# Patient Record
Sex: MALE | Race: WHITE | Employment: OTHER | URBAN - METROPOLITAN AREA
[De-identification: names, ages, dates, MRNs, and addresses within clinical notes are randomized per-mention and may not be internally consistent; named-entity substitution may affect disease eponyms.]

---

## 2016-05-25 LAB
CHOLEST SERPL-MCNC: 186 MG/DL
HDLC SERPL-MCNC: 39 MG/DL
LDLC SERPL CALC-MCNC: 124 MG/DL
NONHDLC SERPL-MCNC: NORMAL MG/DL
TRIGL SERPL-MCNC: 114 MG/DL

## 2017-03-22 ENCOUNTER — TRANSFERRED RECORDS (OUTPATIENT)
Dept: HEALTH INFORMATION MANAGEMENT | Facility: CLINIC | Age: 82
End: 2017-03-22

## 2018-04-06 ENCOUNTER — TRANSFERRED RECORDS (OUTPATIENT)
Dept: HEALTH INFORMATION MANAGEMENT | Facility: CLINIC | Age: 83
End: 2018-04-06

## 2018-12-19 ENCOUNTER — APPOINTMENT (OUTPATIENT)
Dept: CT IMAGING | Facility: CLINIC | Age: 83
DRG: 178 | End: 2018-12-19
Attending: INTERNAL MEDICINE
Payer: MEDICARE

## 2018-12-19 ENCOUNTER — APPOINTMENT (OUTPATIENT)
Dept: CT IMAGING | Facility: CLINIC | Age: 83
DRG: 178 | End: 2018-12-19
Attending: NURSE PRACTITIONER
Payer: MEDICARE

## 2018-12-19 ENCOUNTER — APPOINTMENT (OUTPATIENT)
Dept: GENERAL RADIOLOGY | Facility: CLINIC | Age: 83
DRG: 178 | End: 2018-12-19
Attending: NURSE PRACTITIONER
Payer: MEDICARE

## 2018-12-19 ENCOUNTER — HOSPITAL ENCOUNTER (INPATIENT)
Facility: CLINIC | Age: 83
LOS: 4 days | Discharge: NURSING, REHAB OR LONG-TERM CARE | DRG: 178 | End: 2018-12-23
Attending: NURSE PRACTITIONER | Admitting: HOSPITALIST
Payer: MEDICARE

## 2018-12-19 ENCOUNTER — TRANSFERRED RECORDS (OUTPATIENT)
Dept: HEALTH INFORMATION MANAGEMENT | Facility: CLINIC | Age: 83
End: 2018-12-19

## 2018-12-19 DIAGNOSIS — R79.89 ELEVATED TROPONIN: ICD-10-CM

## 2018-12-19 DIAGNOSIS — G89.29 CHRONIC LOW BACK PAIN, UNSPECIFIED BACK PAIN LATERALITY, WITH SCIATICA PRESENCE UNSPECIFIED: ICD-10-CM

## 2018-12-19 DIAGNOSIS — M62.81 GENERALIZED MUSCLE WEAKNESS: ICD-10-CM

## 2018-12-19 DIAGNOSIS — M54.5 CHRONIC LOW BACK PAIN, UNSPECIFIED BACK PAIN LATERALITY, WITH SCIATICA PRESENCE UNSPECIFIED: ICD-10-CM

## 2018-12-19 DIAGNOSIS — J69.0 ASPIRATION PNEUMONIA, UNSPECIFIED ASPIRATION PNEUMONIA TYPE, UNSPECIFIED LATERALITY, UNSPECIFIED PART OF LUNG (H): ICD-10-CM

## 2018-12-19 DIAGNOSIS — J18.9 PNEUMONIA: ICD-10-CM

## 2018-12-19 LAB
ALBUMIN SERPL-MCNC: 3.5 G/DL (ref 3.4–5)
ALBUMIN UR-MCNC: 10 MG/DL
ALP SERPL-CCNC: 50 U/L (ref 40–150)
ALT SERPL W P-5'-P-CCNC: 24 U/L (ref 0–70)
ANION GAP SERPL CALCULATED.3IONS-SCNC: 8 MMOL/L (ref 3–14)
APPEARANCE UR: CLEAR
AST SERPL W P-5'-P-CCNC: 21 U/L (ref 0–45)
BASOPHILS # BLD AUTO: 0 10E9/L (ref 0–0.2)
BASOPHILS NFR BLD AUTO: 0.2 %
BILIRUB SERPL-MCNC: 1.2 MG/DL (ref 0.2–1.3)
BILIRUB UR QL STRIP: NEGATIVE
BUN SERPL-MCNC: 16 MG/DL (ref 7–30)
CALCIUM SERPL-MCNC: 8.4 MG/DL (ref 8.5–10.1)
CHLORIDE SERPL-SCNC: 108 MMOL/L (ref 94–109)
CO2 BLDCOV-SCNC: 25 MMOL/L (ref 21–28)
CO2 SERPL-SCNC: 25 MMOL/L (ref 20–32)
COLOR UR AUTO: YELLOW
CREAT SERPL-MCNC: 1.07 MG/DL (ref 0.66–1.25)
DIFFERENTIAL METHOD BLD: ABNORMAL
EOSINOPHIL # BLD AUTO: 0 10E9/L (ref 0–0.7)
EOSINOPHIL NFR BLD AUTO: 0.1 %
ERYTHROCYTE [DISTWIDTH] IN BLOOD BY AUTOMATED COUNT: 13.6 % (ref 10–15)
FLUAV+FLUBV AG SPEC QL: NEGATIVE
FLUAV+FLUBV AG SPEC QL: NEGATIVE
GFR SERPL CREATININE-BSD FRML MDRD: 63 ML/MIN/{1.73_M2}
GLUCOSE BLDC GLUCOMTR-MCNC: 126 MG/DL (ref 70–99)
GLUCOSE SERPL-MCNC: 106 MG/DL (ref 70–99)
GLUCOSE UR STRIP-MCNC: NEGATIVE MG/DL
HCT VFR BLD AUTO: 36.8 % (ref 40–53)
HGB BLD-MCNC: 12.9 G/DL (ref 13.3–17.7)
HGB UR QL STRIP: ABNORMAL
HYALINE CASTS #/AREA URNS LPF: 1 /LPF (ref 0–2)
IMM GRANULOCYTES # BLD: 0 10E9/L (ref 0–0.4)
IMM GRANULOCYTES NFR BLD: 0.1 %
INR PPP: 2.05 (ref 0.86–1.14)
INTERPRETATION ECG - MUSE: NORMAL
KETONES UR STRIP-MCNC: NEGATIVE MG/DL
LACTATE BLD-SCNC: 0.6 MMOL/L (ref 0.7–2.1)
LACTATE BLD-SCNC: 1.1 MMOL/L (ref 0.7–2)
LEUKOCYTE ESTERASE UR QL STRIP: NEGATIVE
LYMPHOCYTES # BLD AUTO: 1.2 10E9/L (ref 0.8–5.3)
LYMPHOCYTES NFR BLD AUTO: 14.5 %
MCH RBC QN AUTO: 33.1 PG (ref 26.5–33)
MCHC RBC AUTO-ENTMCNC: 35.1 G/DL (ref 31.5–36.5)
MCV RBC AUTO: 94 FL (ref 78–100)
MONOCYTES # BLD AUTO: 0.9 10E9/L (ref 0–1.3)
MONOCYTES NFR BLD AUTO: 11 %
MUCOUS THREADS #/AREA URNS LPF: PRESENT /LPF
NEUTROPHILS # BLD AUTO: 6.1 10E9/L (ref 1.6–8.3)
NEUTROPHILS NFR BLD AUTO: 74.1 %
NITRATE UR QL: NEGATIVE
NRBC # BLD AUTO: 0 10*3/UL
NRBC BLD AUTO-RTO: 0 /100
PCO2 BLDV: 37 MM HG (ref 40–50)
PH BLDV: 7.43 PH (ref 7.32–7.43)
PH UR STRIP: 6.5 PH (ref 5–7)
PLATELET # BLD AUTO: 157 10E9/L (ref 150–450)
PO2 BLDV: 58 MM HG (ref 25–47)
POTASSIUM SERPL-SCNC: 4 MMOL/L (ref 3.4–5.3)
PROCALCITONIN SERPL-MCNC: 0.31 NG/ML
PROT SERPL-MCNC: 6.7 G/DL (ref 6.8–8.8)
RBC # BLD AUTO: 3.9 10E12/L (ref 4.4–5.9)
RBC #/AREA URNS AUTO: 22 /HPF (ref 0–2)
SAO2 % BLDV FROM PO2: 91 %
SODIUM SERPL-SCNC: 141 MMOL/L (ref 133–144)
SOURCE: ABNORMAL
SP GR UR STRIP: 1.01 (ref 1–1.03)
SPECIMEN SOURCE: NORMAL
SQUAMOUS #/AREA URNS AUTO: <1 /HPF (ref 0–1)
TROPONIN I SERPL-MCNC: 0.04 UG/L (ref 0–0.04)
UROBILINOGEN UR STRIP-MCNC: NORMAL MG/DL (ref 0–2)
WBC # BLD AUTO: 8.3 10E9/L (ref 4–11)
WBC #/AREA URNS AUTO: <1 /HPF (ref 0–5)

## 2018-12-19 PROCEDURE — 25000128 H RX IP 250 OP 636: Performed by: NURSE PRACTITIONER

## 2018-12-19 PROCEDURE — 00000146 ZZHCL STATISTIC GLUCOSE BY METER IP

## 2018-12-19 PROCEDURE — 85610 PROTHROMBIN TIME: CPT | Performed by: NURSE PRACTITIONER

## 2018-12-19 PROCEDURE — 99285 EMERGENCY DEPT VISIT HI MDM: CPT | Mod: 25

## 2018-12-19 PROCEDURE — 96365 THER/PROPH/DIAG IV INF INIT: CPT

## 2018-12-19 PROCEDURE — 87040 BLOOD CULTURE FOR BACTERIA: CPT | Performed by: NURSE PRACTITIONER

## 2018-12-19 PROCEDURE — 96361 HYDRATE IV INFUSION ADD-ON: CPT

## 2018-12-19 PROCEDURE — 12000000 ZZH R&B MED SURG/OB

## 2018-12-19 PROCEDURE — 83605 ASSAY OF LACTIC ACID: CPT

## 2018-12-19 PROCEDURE — 71046 X-RAY EXAM CHEST 2 VIEWS: CPT

## 2018-12-19 PROCEDURE — 99223 1ST HOSP IP/OBS HIGH 75: CPT | Mod: AI | Performed by: HOSPITALIST

## 2018-12-19 PROCEDURE — 84145 PROCALCITONIN (PCT): CPT | Performed by: NURSE PRACTITIONER

## 2018-12-19 PROCEDURE — 36415 COLL VENOUS BLD VENIPUNCTURE: CPT | Performed by: HOSPITALIST

## 2018-12-19 PROCEDURE — 84484 ASSAY OF TROPONIN QUANT: CPT | Performed by: NURSE PRACTITIONER

## 2018-12-19 PROCEDURE — 70450 CT HEAD/BRAIN W/O DYE: CPT

## 2018-12-19 PROCEDURE — 87804 INFLUENZA ASSAY W/OPTIC: CPT | Performed by: NURSE PRACTITIONER

## 2018-12-19 PROCEDURE — 96367 TX/PROPH/DG ADDL SEQ IV INF: CPT

## 2018-12-19 PROCEDURE — 93005 ELECTROCARDIOGRAM TRACING: CPT

## 2018-12-19 PROCEDURE — 51702 INSERT TEMP BLADDER CATH: CPT

## 2018-12-19 PROCEDURE — 80053 COMPREHEN METABOLIC PANEL: CPT | Performed by: NURSE PRACTITIONER

## 2018-12-19 PROCEDURE — 70450 CT HEAD/BRAIN W/O DYE: CPT | Mod: 77

## 2018-12-19 PROCEDURE — 85025 COMPLETE CBC W/AUTO DIFF WBC: CPT | Performed by: NURSE PRACTITIONER

## 2018-12-19 PROCEDURE — 25000128 H RX IP 250 OP 636: Performed by: HOSPITALIST

## 2018-12-19 PROCEDURE — 81001 URINALYSIS AUTO W/SCOPE: CPT | Performed by: NURSE PRACTITIONER

## 2018-12-19 PROCEDURE — 82803 BLOOD GASES ANY COMBINATION: CPT

## 2018-12-19 PROCEDURE — 25000125 ZZHC RX 250

## 2018-12-19 PROCEDURE — 83605 ASSAY OF LACTIC ACID: CPT | Performed by: HOSPITALIST

## 2018-12-19 RX ORDER — ONDANSETRON 4 MG/1
4 TABLET, ORALLY DISINTEGRATING ORAL EVERY 6 HOURS PRN
Status: DISCONTINUED | OUTPATIENT
Start: 2018-12-19 | End: 2018-12-23 | Stop reason: HOSPADM

## 2018-12-19 RX ORDER — ALBUTEROL SULFATE 0.83 MG/ML
2.5 SOLUTION RESPIRATORY (INHALATION) EVERY 6 HOURS PRN
Status: DISCONTINUED | OUTPATIENT
Start: 2018-12-19 | End: 2018-12-23 | Stop reason: HOSPADM

## 2018-12-19 RX ORDER — ACETAMINOPHEN 325 MG/1
650 TABLET ORAL EVERY 4 HOURS PRN
Status: DISCONTINUED | OUTPATIENT
Start: 2018-12-19 | End: 2018-12-19

## 2018-12-19 RX ORDER — AMOXICILLIN 250 MG
2 CAPSULE ORAL 2 TIMES DAILY
Status: DISCONTINUED | OUTPATIENT
Start: 2018-12-19 | End: 2018-12-23 | Stop reason: HOSPADM

## 2018-12-19 RX ORDER — PIPERACILLIN SODIUM, TAZOBACTAM SODIUM 4; .5 G/20ML; G/20ML
4.5 INJECTION, POWDER, LYOPHILIZED, FOR SOLUTION INTRAVENOUS ONCE
Status: COMPLETED | OUTPATIENT
Start: 2018-12-19 | End: 2018-12-19

## 2018-12-19 RX ORDER — NALOXONE HYDROCHLORIDE 0.4 MG/ML
.1-.4 INJECTION, SOLUTION INTRAMUSCULAR; INTRAVENOUS; SUBCUTANEOUS
Status: DISCONTINUED | OUTPATIENT
Start: 2018-12-19 | End: 2018-12-23 | Stop reason: HOSPADM

## 2018-12-19 RX ORDER — POLYETHYLENE GLYCOL 3350 17 G/17G
17 POWDER, FOR SOLUTION ORAL DAILY PRN
Status: DISCONTINUED | OUTPATIENT
Start: 2018-12-19 | End: 2018-12-23 | Stop reason: HOSPADM

## 2018-12-19 RX ORDER — ACETAMINOPHEN 650 MG/1
650 SUPPOSITORY RECTAL EVERY 4 HOURS PRN
Status: DISCONTINUED | OUTPATIENT
Start: 2018-12-19 | End: 2018-12-23 | Stop reason: HOSPADM

## 2018-12-19 RX ORDER — ACETAMINOPHEN 325 MG/1
650 TABLET ORAL EVERY 4 HOURS PRN
Status: DISCONTINUED | OUTPATIENT
Start: 2018-12-19 | End: 2018-12-23 | Stop reason: HOSPADM

## 2018-12-19 RX ORDER — PIPERACILLIN SODIUM, TAZOBACTAM SODIUM 3; .375 G/15ML; G/15ML
3.38 INJECTION, POWDER, LYOPHILIZED, FOR SOLUTION INTRAVENOUS EVERY 6 HOURS
Status: COMPLETED | OUTPATIENT
Start: 2018-12-19 | End: 2018-12-22

## 2018-12-19 RX ORDER — CARBIDOPA/LEVODOPA 25MG-250MG
1 TABLET ORAL
Status: DISCONTINUED | OUTPATIENT
Start: 2018-12-19 | End: 2018-12-23 | Stop reason: HOSPADM

## 2018-12-19 RX ORDER — AZITHROMYCIN 250 MG/1
250 TABLET, FILM COATED ORAL DAILY
Status: COMPLETED | OUTPATIENT
Start: 2018-12-20 | End: 2018-12-23

## 2018-12-19 RX ORDER — AMOXICILLIN 250 MG
1 CAPSULE ORAL 2 TIMES DAILY
Status: DISCONTINUED | OUTPATIENT
Start: 2018-12-19 | End: 2018-12-23 | Stop reason: HOSPADM

## 2018-12-19 RX ORDER — SODIUM CHLORIDE 9 MG/ML
1000 INJECTION, SOLUTION INTRAVENOUS CONTINUOUS
Status: DISCONTINUED | OUTPATIENT
Start: 2018-12-19 | End: 2018-12-21

## 2018-12-19 RX ORDER — WARFARIN SODIUM 5 MG/1
5 TABLET ORAL
COMMUNITY

## 2018-12-19 RX ORDER — ONDANSETRON 2 MG/ML
4 INJECTION INTRAMUSCULAR; INTRAVENOUS EVERY 6 HOURS PRN
Status: DISCONTINUED | OUTPATIENT
Start: 2018-12-19 | End: 2018-12-23 | Stop reason: HOSPADM

## 2018-12-19 RX ORDER — ACETAMINOPHEN 500 MG
500 TABLET ORAL 4 TIMES DAILY
Status: DISCONTINUED | OUTPATIENT
Start: 2018-12-19 | End: 2018-12-19

## 2018-12-19 RX ORDER — TRAMADOL HYDROCHLORIDE 50 MG/1
50 TABLET ORAL EVERY 6 HOURS PRN
Status: DISCONTINUED | OUTPATIENT
Start: 2018-12-19 | End: 2018-12-19

## 2018-12-19 RX ADMIN — LIDOCAINE HYDROCHLORIDE 10 ML: 20 JELLY TOPICAL at 13:50

## 2018-12-19 RX ADMIN — PIPERACILLIN SODIUM,TAZOBACTAM SODIUM 4.5 G: 4; .5 INJECTION, POWDER, FOR SOLUTION INTRAVENOUS at 13:50

## 2018-12-19 RX ADMIN — SODIUM CHLORIDE 1000 ML: 9 INJECTION, SOLUTION INTRAVENOUS at 20:15

## 2018-12-19 RX ADMIN — PIPERACILLIN SODIUM,TAZOBACTAM SODIUM 3.38 G: 3; .375 INJECTION, POWDER, FOR SOLUTION INTRAVENOUS at 20:16

## 2018-12-19 RX ADMIN — AZITHROMYCIN MONOHYDRATE 500 MG: 500 INJECTION, POWDER, LYOPHILIZED, FOR SOLUTION INTRAVENOUS at 14:38

## 2018-12-19 RX ADMIN — SODIUM CHLORIDE 1000 ML: 9 INJECTION, SOLUTION INTRAVENOUS at 12:23

## 2018-12-19 ASSESSMENT — ACTIVITIES OF DAILY LIVING (ADL)
RETIRED_COMMUNICATION: 0-->UNDERSTANDS/COMMUNICATES WITHOUT DIFFICULTY
RETIRED_EATING: 0-->INDEPENDENT
DRESS: 0-->INDEPENDENT
COGNITION: 0 - NO COGNITION ISSUES REPORTED
BATHING: 0-->INDEPENDENT
TRANSFERRING: 0-->INDEPENDENT
TOILETING: 0-->INDEPENDENT
SWALLOWING: 0-->SWALLOWS FOODS/LIQUIDS WITHOUT DIFFICULTY
AMBULATION: 1-->ASSISTIVE EQUIPMENT
ADLS_ACUITY_SCORE: 13
FALL_HISTORY_WITHIN_LAST_SIX_MONTHS: NO

## 2018-12-19 ASSESSMENT — ENCOUNTER SYMPTOMS
ABDOMINAL PAIN: 0
DIAPHORESIS: 1
SHORTNESS OF BREATH: 0
FEVER: 1
COUGH: 1
WEAKNESS: 1

## 2018-12-19 ASSESSMENT — MIFFLIN-ST. JEOR
SCORE: 1613.42
SCORE: 1589.84

## 2018-12-19 NOTE — H&P
[facility]    History and Physical - Hospitalist Service       Date of Admission:  12/19/2018    Assessment & Plan   Russ Loera is a 84 year old male admitted on 12/19/2018 for cough and fever who was found to have retrocardiac opacity indicating a likely pneumonia.    Community acquired pneumonia   - Left retrocardiac opacity, fever, cough; WBC normal range  - Influenza A/B antigen negative  - blood cultures pending  - procalc 0.31; will recheck tomorrow  - Started on azithro and zosyn in ed, will continue for now  - Incentive spirometry  - prn albuterol nebs    History of PE - on warfarin for anticoagulation  - Pharmacy to dose warfarin  - Saturating normally on RA, monitor clinically  - VBG unremarkable; troponin 0.042, will recheck troponin - no anginal symptoms    Recent falls  - Physical therapy consulted   - CT head completed - chronic small vessel ischemic disease changes, no acute intracranial pathology    History of BPH  - Cummins placed in ED, okay to continue tonight - remove when possible    History of Parkinsons Disease  - Continue PTA carbidopa-levodopa 25-250mg tid     Diet: regular  DVT Prophylaxis: Warfarin  Cummins Catheter: in place, indication:  BPH  Code Status: Full code    Disposition Plan   Expected discharge: In 2-3 days pending improved of weakness and transition to oral antibiotics    Entered: Brian Conde MD 12/19/2018, 4:23 PM     The patient's care was discussed with the Patient.    Brian Conde MD  [facility]    ______________________________________________________________________    Chief Complaint   Cough and fever    History is obtained from the patient    History of Present Illness   Russ Loera is a 84 year old male admitted on 12/19/2018 for cough and fever who was found to have retrocardiac opacity likely reflective of pneumonia. He has a history of PE anticoagulated on warfarin, Parkinson's Disease, GERD, and hypertension.  He  was seen yesterday in clinic with his PCP where he was diagnosed with a likely viral upper respiratory infection and given a prescription for amoxicillin-which was to be started if symptoms worsen.  This morning he was apparently found to bed in bed rail by independent living staff was unable to get himself up.  Temperature was reported to be 101.9.  He was diaphoretic.  EMS was contacted and he has brought to the ED for further workup.  Currently he is in the best historian but says that he started having a sore throat yesterday as well as persistent cough and some weakness in the last few days.  He does endorse fever as well.  Otherwise he denies headache, dizziness, shortness of breath, chest pain or pressure, abdominal pain, nausea, vomiting, or diarrhea.  He has been helping care for his wife who is quite ill it seems that does have a feeding tube.    Review of Systems    The 10 point Review of Systems is negative other than noted in the HPI or here.     Past Medical History    I have reviewed this patient's medical history and updated it with pertinent information if needed.   Past Medical History:   Diagnosis Date     Degenerative joint disease      Gastro-oesophageal reflux disease      GERD (gastroesophageal reflux disease)      History of BPH      Hypertension      Osteoarthritis      Parkinson disease (H)      Parkinsons disease (H)      Plantar fasciitis of right foot      PONV (postoperative nausea and vomiting)      Prostate nodule with urinary obstruction 7-9-12     Pulmonary embolism (H)      Spinal stenosis      Tendonitis, Achilles, right      Unspecified cerebral artery occlusion with cerebral infarction        Past Surgical History   I have reviewed this patient's surgical history and updated it with pertinent information if needed.  Past Surgical History:   Procedure Laterality Date     C REMOVAL OF KIDNEY STONE       C TOTAL KNEE ARTHROPLASTY  2011     HERNIORRHAPHY INGUINAL       INCISION  LIMBAL RELAXING, KERATOTOMY ARCUATE  1/20/2014    Procedure: INCISION LIMBAL RELAXING, KERATOTOMY ARCUATE;;  Surgeon: Rosalio Montoya MD;  Location: [facility]     INCISION LIMBAL RELAXING, KERATOTOMY ARCUATE  2/10/2014    Procedure: INCISION LIMBAL RELAXING, KERATOTOMY ARCUATE;;  Surgeon: Rosalio Montoya MD;  Location: [facility]     LAMINECTOMY LUMBAR MINIMALLY INVASIVE ONE LEVEL       LAMINECTOMY, FUSION LUMBAR TWO LEVELS, COMBINED      L3-L5     PHACOEMULSIFICATION CLEAR CORNEA WITH STANDARD INTRAOCULAR LENS IMPLANT  1/20/2014    Procedure: PHACOEMULSIFICATION CLEAR CORNEA WITH STANDARD INTRAOCULAR LENS IMPLANT;  RIGHT PHACOEMULSIFICATION CLEAR CORNEA WITH STANDARD INTRAOCULAR LENS IMPLANT, LIMBRAL RELAXING INCISION  ;  Surgeon: Rosalio Montoya MD;  Location: [facility]     PHACOEMULSIFICATION CLEAR CORNEA WITH STANDARD INTRAOCULAR LENS IMPLANT  2/10/2014    Procedure: PHACOEMULSIFICATION CLEAR CORNEA WITH STANDARD INTRAOCULAR LENS IMPLANT;  LEFT PHACOEMULSIFICATION CLEAR CORNEA WITH STANDARD INTRAOCULAR LENS IMPLANT WITH LIMBAL RELAXING INCISION;  Surgeon: Rosalio Montoya MD;  Location: [facility]     REVERSE ARTHROPLASTY SHOULDER  12/4/2012    Procedure: REVERSE ARTHROPLASTY SHOULDER;  LEFT REVERSE TOTAL SHOULDER ARTHROPLASTY;  Surgeon: Tre Patel MD;  Location:  [facility]     TONSILLECTOMY         Social History   I have reviewed this patient's social history and updated it with pertinent information if needed.  Social History     Tobacco Use     Smoking status: Never Smoker     Smokeless tobacco: Never Used   Substance Use Topics     Alcohol use: No     Drug use: No       Family History   I have reviewed this patient's family history and updated it with pertinent information if needed.   History reviewed. No pertinent family history.     Prior to Admission Medications   Prior to Admission Medications   Prescriptions Last Dose Informant Patient Reported? Taking?   acetaminophen (TYLENOL) 500 MG tablet 12/19/2018 at  am Self Yes Yes   Sig: Take 500 mg by mouth 4 times daily    carbidopa-levodopa (SINEMET)  MG per tablet 12/19/2018 at 0400 Self Yes Yes   Sig: Take 1 tablet by mouth 3 times daily 0400; 1100; 1700   traMADol (ULTRAM) 50 MG tablet  at prn Self Yes Yes   Sig: Take 50 mg by mouth every 6 hours as needed for moderate pain   warfarin (COUMADIN) 5 MG tablet 12/19/2018 at am Self Yes Yes   Sig: Take 5 mg by mouth daily      Facility-Administered Medications: None     Allergies   Allergies   Allergen Reactions     Oxycodone Anaphylaxis     Fentanyl Nausea     Propofol Nausea and Vomiting       Physical Exam   Vital Signs: Temp: 99.2  F (37.3  C) Temp src: Oral BP: 165/84 Pulse: 76 Heart Rate: 80 Resp: 18 SpO2: 93 % O2 Device: None (Room air)    Weight: 197 lbs 0 oz    GEN: nad, resting, but aroused by verbal cue   HEENT: normocephalic, atraumatic, EOMI, mmm  CV: RRR, s1s2, no murmur  Resp: a few bibasilar crackles noted  Abdo: benign, s, nt, nd, +bs  Ext: no edema, well perfused, nontender  Neuro: oriented x3, no focal deficits, congruent with PD history    Data   Data reviewed today: I reviewed all medications, new labs and imaging results over the last 24 hours. I personally reviewed no images or EKG's today.    Recent Labs   Lab 12/19/18  1125   WBC 8.3   HGB 12.9*   MCV 94      INR 2.05*      POTASSIUM 4.0   CHLORIDE 108   CO2 25   BUN 16   CR 1.07   ANIONGAP 8   RENU 8.4*   *   ALBUMIN 3.5   PROTTOTAL 6.7*   BILITOTAL 1.2   ALKPHOS 50   ALT 24   AST 21   TROPI 0.042

## 2018-12-19 NOTE — ED PROVIDER NOTES
History     Chief Complaint:  Generalized Weakness    The history is provided by the patient and the EMS personnel.      Russ Loera is a 84 year old male with a history of PE anticoagulated on warfarin, Parkinson's Disease, GERD and hypertension who presents to the emergency department via EMS for evaluation of generalized weakness. Of note, the patient lives in an independent living facility with his wife, who he helps care for. Yesterday, the patient was seen by his primary physician, Dr. Yee, at the [facility], where he was diagnosed with a likely viral URI and given a prescription for amoxicillin with instructions to start them if he worsens. This morning, the patient was reportedly found by independent living staff wedged in between his bed and the bed rail and was too weak to get himself up with a temperature of 101.9 and covered in sweat prompting them to contact EMS to have the patient transferred to the ED for further evaluation. Here, the patient denies any co-occurrence of chest pain, shortness of breath or abdominal pain, but does endorse feeling weak along with a persistent cough. He denies taking any of the prescribed antibiotics. Of note, the patient's factor 10 chromogenic yesterday resulted at 30.  The patient had cautery to the area behind his right ear by dermatology due to bleeding and had a biopsy completed.     Allergies:  Oxycodone  Fentanyl  Propofol   Sulfa Drugs     Medications:    Acetaminophen  Carbidopa-Levodopa  Lisinopril  Tamsulosin  Tramadol  Trazodone  Warfarin  Miralax  Senokot   Gabapentin    Past Medical History:    DJD  GERD  BPH  Hypertension  Osteoarthritis   Parkinson's Disease  Prostate Nodule  PE  Spinal Stenosis   Achilles Tendonitis   Cerebral Artery Occlusion with Cerebral Infarction    Past Surgical History:    Kidney Stone Removal   Total knee Arthroplasty  Arcuate Keratotomy   Lumbar Laminectomy x2  Phacoemulsification  "x2  Tonsillectomy  Left Reverse Total Arthroplasty    Family History:    No past pertinent family history.    Social History:  Marital Status:   [2]  Tobacco Use: No  Alcohol Use: No    Review of Systems   Constitutional: Positive for diaphoresis and fever.   Respiratory: Positive for cough. Negative for shortness of breath.    Cardiovascular: Negative for chest pain.   Gastrointestinal: Negative for abdominal pain.   Neurological: Positive for weakness.   All other systems reviewed and are negative.    Physical Exam     Patient Vitals for the past 24 hrs:   BP Temp Temp src Pulse Heart Rate Resp SpO2 Height Weight   12/19/18 1500 165/84 -- -- 76 80 18 -- -- --   12/19/18 1430 (!) 170/126 -- -- 71 77 -- -- -- --   12/19/18 1420 162/88 -- -- -- 66 -- -- -- --   12/19/18 1410 -- -- -- -- 68 -- -- -- --   12/19/18 1400 (!) 155/123 -- -- 65 65 13 -- -- --   12/19/18 1350 144/89 -- -- -- 63 -- -- -- --   12/19/18 1130 (!) 164/96 99.2  F (37.3  C) Oral 71 71 15 93 % 1.778 m (5' 10\") 89.4 kg (197 lb)     Physical Exam  Nursing notes reviewed. Vitals reviewed.  General: Alert. Well kept.  Eyes:  Conjunctiva non-injected, non-icteric.  Ears:TM s normal. Healed surgical site behind right ear without erythema or drainage.  Neck/Throat: Moist mucous membranes, oropharynx clear without erythema or exudate.  Normal voice.  Cardiac: Regular rhythm. Normal heart sounds with no murmur/rubs/click. No peripheral edema.  Pulmonary: Coarse right lower lobe breath sounds.  Abdomen: Soft. Non-distended. Non-tender to palpation. No masses. No guarding or rebound.  Musculoskeletal: Normal gross range of motion of all 4 extremities.  4/5 strength bilateral upper and lower extremities.  Neurological: Alert and oriented x4. Cranial nerves II-XII intact. Finger-nose-finger and rapid alternating thumb-finger coordinated and equal bilateral. Heel shin smooth and equal bilateral. GCS 15. No agitation and not disoriented. Displays no " weakness, no atrophy, no tremor, facial symmetry, normal stance, normal speech. No pronator drift.   Skin: Warm and dry without rashes or petechiae. Normal appearance of visualized exposed skin.  Psych: Affect normal. Good eye contact.    Emergency Department Course   ECG:  Indication: Weakness  Time: 1206  Vent. Rate 75 bpm. AK interval 176. QRS duration 78. QT/QTc 402/448. P-R-T axis 68 -19 -36. Sinus rhythm with premature atrial complexes. Otherwise normal ECG. No significant change compared to EKG dated 7/13/16. Read time: 1210 ([place])    Imaging:  XR Chest 2 views:   Since CT exam on January 29, 2010, heart size is normal. Patchy left retrocardiac opacity is noted. This could relate to atelectasis, though pneumonia is a radiographically possibility. Consider surveillance until resolution. No pleural effusion or pneumothorax. 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   As per radiology.     CT Head without contrast:   Diffuse cerebral volume loss and cerebral white matter changes consistent with chronic small vessel ischemic disease. No evidence for acute intracranial pathology.   As per radiology.    Laboratory:  CBC: WBC: 8.3, HGB: 12.9 (L), PLT: 157  CMP: Glucose 106 (H), Calcium: 8.4 (L), Protein Total: 6.7 (L), o/w WNL (Creatinine: 1.07)  ISTAT VBG: PCO2: 37 (L), PO2: 58 (H), Lactic Acid: 0.6 (L), o/w WNL  Troponin I: 0.042  INR: 2.05 (H)  Procalcitonin: 0.31  Blood Culture x2: Pending  Influenza A/B Antigen: Negative  UA with micro: Blood: Small, Protein Albumin: 10, RBC: 22 (H), Mucous: Present, o/w negative    Interventions:  1223 NS 1L IV  1350 Zosyn 4.5 g, IV  1438 Azithromycin 500 mg, IV    Emergency Department Course:  1142 Nursing notes and vitals reviewed. I performed an exam of the patient as documented above.     IV inserted. Medicine administered as documented above. Blood drawn. This was sent to the lab for further testing, results above.    The patient was sent for a chest xray  and head CT while in the emergency department, findings above.     1210 I rechecked the patient and discussed the results of his workup thus far. I spoke with the wife, through her hospice nurse, who told me that the patient has fallen 3 times in the last 4 days.     1350 I shared service with Dr. Fenton    1354 I consulted with Dr. Conde of the hospitalist services. They are in agreement to accept the patient for admission.    Findings and plan explained to the patient who consents to admission. Discussed the patient with Dr. Conde, who will admit the patient to an inpatient med bed for further monitoring, evaluation, and treatment.    1357 I updated the patient's wife's hospice nurse and updated her on the patient's admission.       Impression & Plan      Medical Decision Making:  Russ Loera is a 84 year old male with a history of parkinson's and PE who presents for evaluation of generalized weakness and cough. The patient had onset of symptoms of sore throat, cough and fever 4 days prior and family notes that he has had 3 falls since onset of symptoms. The patient was able to drive to primary care office yesterday and was diagnosed with a viral illness, but was unable to get out of bed this morning and EMS was called. On arrival of EMS, he did have a temp of 101 and a productive cough. Xray today shows retrocardiac opacity consistent with a pneumonia. His influenza is negative. Procalcitonin and white blood cell count are within normal limits, but with fever of 101 and new retrocardiac opacity he will be treated for healthcare acquire pneumonia. He and his wife live in an assisted living. He was started on Zosyn and azithromycin. There are no other signs of cause of his illness. Urine is without infection. He did have to have a Cummins catheter placed secondary to weakness getting up and history of BPH. Head CT was obtained secondary to recent falls and patient is currently on warfarin. Head CT shows no  intracranial abnormality. He has no focal neurologic deficits and NIH stroke scale is 0. He has no current signs of sepsis. He is hemodynamically stable and lactate is normal. I spoke with Dr. Conde who has admitted him for further management and care. I did speak with his wife regarding his admission.     Diagnosis:    ICD-10-CM    1. Pneumonia J18.9 Procalcitonin     INR     INR     Blood culture     Blood culture     CANCELED: INR   2. Generalized muscle weakness M62.81      Disposition:  Admitted to Dr. Conde    Scribe Disclosure:  I, Drew Jimenez, am serving as a scribe on 12/19/2018 at 11:38 AM to personally document services performed by Beata Krueger CNP based on my observations and the provider's statements to me.       Drew Jimenez  12/19/2018    EMERGENCY DEPARTMENT       Beata Krueger CNP  12/19/18 [id number]       Beata Krueger CNP  12/19/18 [id number]

## 2018-12-19 NOTE — ED NOTES
"[facility]  ED Nurse Handoff Report    ED Chief complaint: Generalized Weakness (Arrives via EMS from home. Lives at the [facility] in [place] but is independant. Takes care of wife who had a stroke. Satff at facility found him today too weak to get up. Wedged between bed and bedrail. St=afff there checked temp and found it to be 101.9. Denies pain, no SOB but sounds wheezy )      ED Diagnosis:   Final diagnoses:   Pneumonia   Generalized muscle weakness       Code Status: Full Code    Allergies:   Allergies   Allergen Reactions     Oxycodone Anaphylaxis     Fentanyl Nausea     Propofol Nausea and Vomiting       Activity level - Baseline/Home:  Independent    Activity Level - Current:   Stand with Assist of 2     Needed?: No    Isolation: No  Infection: Not Applicable  Bariatric?: No    Vital Signs:   Vitals:    12/19/18 1130   BP: (!) 164/96   Pulse: 71   Resp: 15   Temp: 99.2  F (37.3  C)   TempSrc: Oral   SpO2: 93%   Weight: 89.4 kg (197 lb)   Height: 1.778 m (5' 10\")       Cardiac Rhythm: ,        Pain level: 0-10 Pain Scale: 0    Is this patient confused?: No   Does this patient have a guardian?  No         If yes, is there guardianship documents in the Epic \"Code/ACP\" activity?  N/A         Guardian Notified?  N/A  [place] - Suicide Severity Rating Scale Completed?  Yes  If yes, what color did the patient score?  White    Patient Report: Initial Complaint: Russ Loera is a 84 year old male with a history of PE, Parkinson's Disease, GERD and hypertension who presents to the emergency department via EMS for evaluation of generalized weakness. Of note, the patient lives in an independent living facility with his wife, who he helps care for. Yesterday, the patient was seen by his primary physician, Dr. Yee, at the [facility], where he was diagnosed with a likely viral URI and given a prescription for amoxicillin with instructions to start them if he worsens. This " morning, the patient was reportedly found by independent living staff wedged in between his bed and the bed rail and was too weak to get himself up with a temperature of 101.9 and covered in sweat prompting them to contact EMS to have the patient transferred to the ED for further evaluation. Here, the patient denies any co-occurrence of chest pain, shortness of breath or abdominal pain, but does endorse feeling weak along with a persistent cough. He denies taking any of the prescribed antibiotics.    Focused Assessment: generalized weakness, too weak to stand at this point independently, lungs wheezy, has started coughing up secretions since arrival to ED. Prior to arrival denies this was an issue  Tests Performed: labs, EKG, head CT, chest xray  Abnormal Results:   Results for orders placed or performed during the hospital encounter of 12/19/18 (from the past 24 hour(s))   CBC with platelets differential   Result Value Ref Range    WBC 8.3 4.0 - 11.0 10e9/L    RBC Count 3.90 (L) 4.4 - 5.9 10e12/L    Hemoglobin 12.9 (L) 13.3 - 17.7 g/dL    Hematocrit 36.8 (L) 40.0 - 53.0 %    MCV 94 78 - 100 fl    MCH 33.1 (H) 26.5 - 33.0 pg    MCHC 35.1 31.5 - 36.5 g/dL    RDW 13.6 10.0 - 15.0 %    Platelet Count 157 150 - 450 10e9/L    Diff Method Automated Method     % Neutrophils 74.1 %    % Lymphocytes 14.5 %    % Monocytes 11.0 %    % Eosinophils 0.1 %    % Basophils 0.2 %    % Immature Granulocytes 0.1 %    Nucleated RBCs 0 0 /100    Absolute Neutrophil 6.1 1.6 - 8.3 10e9/L    Absolute Lymphocytes 1.2 0.8 - 5.3 10e9/L    Absolute Monocytes 0.9 0.0 - 1.3 10e9/L    Absolute Eosinophils 0.0 0.0 - 0.7 10e9/L    Absolute Basophils 0.0 0.0 - 0.2 10e9/L    Abs Immature Granulocytes 0.0 0 - 0.4 10e9/L    Absolute Nucleated RBC 0.0    Comprehensive metabolic panel   Result Value Ref Range    Sodium 141 133 - 144 mmol/L    Potassium 4.0 3.4 - 5.3 mmol/L    Chloride 108 94 - 109 mmol/L    Carbon Dioxide 25 20 - 32 mmol/L    Anion Gap 8  3 - 14 mmol/L    Glucose 106 (H) 70 - 99 mg/dL    Urea Nitrogen 16 7 - 30 mg/dL    Creatinine 1.07 0.66 - 1.25 mg/dL    GFR Estimate 63 >60 mL/min/[1.73_m2]    GFR Estimate If Black 73 >60 mL/min/[1.73_m2]    Calcium 8.4 (L) 8.5 - 10.1 mg/dL    Bilirubin Total 1.2 0.2 - 1.3 mg/dL    Albumin 3.5 3.4 - 5.0 g/dL    Protein Total 6.7 (L) 6.8 - 8.8 g/dL    Alkaline Phosphatase 50 40 - 150 U/L    ALT 24 0 - 70 U/L    AST 21 0 - 45 U/L   Procalcitonin   Result Value Ref Range    Procalcitonin 0.31 ng/ml   Troponin I   Result Value Ref Range    Troponin I ES 0.042 0.000 - 0.045 ug/L   INR   Result Value Ref Range    INR 2.05 (H) 0.86 - 1.14   ISTAT gases lactate jeffrey POCT   Result Value Ref Range    Ph Venous 7.43 7.32 - 7.43 pH    PCO2 Venous 37 (L) 40 - 50 mm Hg    PO2 Venous 58 (H) 25 - 47 mm Hg    Bicarbonate Venous 25 21 - 28 mmol/L    O2 Sat Venous 91 %    Lactic Acid 0.6 (L) 0.7 - 2.1 mmol/L   EKG 12 lead   Result Value Ref Range    Interpretation ECG Click View Image link to view waveform and result    Influenza A/B antigen   Result Value Ref Range    Influenza A/B Agn Specimen Nares     Influenza A Negative NEG^Negative    Influenza B Negative NEG^Negative   UA with Microscopic reflex to Culture   Result Value Ref Range    Color Urine Yellow     Appearance Urine Clear     Glucose Urine Negative NEG^Negative mg/dL    Bilirubin Urine Negative NEG^Negative    Ketones Urine Negative NEG^Negative mg/dL    Specific Gravity Urine 1.014 1.003 - 1.035    Blood Urine Small (A) NEG^Negative    pH Urine 6.5 5.0 - 7.0 pH    Protein Albumin Urine 10 (A) NEG^Negative mg/dL    Urobilinogen mg/dL Normal 0.0 - 2.0 mg/dL    Nitrite Urine Negative NEG^Negative    Leukocyte Esterase Urine Negative NEG^Negative    Source Midstream Urine     WBC Urine <1 0 - 5 /HPF    RBC Urine 22 (H) 0 - 2 /HPF    Squamous Epithelial /HPF Urine <1 0 - 1 /HPF    Mucous Urine Present (A) NEG^Negative /LPF    Hyaline Casts 1 0 - 2 /LPF   Chest XR,  PA & LAT     Narrative    CHEST TWO VIEWS December 19, 2018 12:36 PM     HISTORY: 84-year-old patient with cough and weakness.       Impression    IMPRESSION: Since CT exam on January 29, 2010, heart size is normal.  Patchy left retrocardiac opacity is noted. This could relate to  atelectasis, though pneumonia is a radiographically possibility.  Consider surveillance until resolution. No pleural effusion or  pneumothorax. 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    BOB DALLAS MD   Head CT w/o contrast    Narrative    CT OF THE HEAD WITHOUT CONTRAST December 19, 2018 1:19 PM     HISTORY: Head trauma, minor, GCS>=13, high clinical risk, initial  exam.    TECHNIQUE: 5 mm thick axial CT images of the head were acquired  without IV contrast material. Radiation dose for this scan was reduced  using automated exposure control, adjustment of the mA and/or kV  according to patient size, or iterative reconstruction technique.    COMPARISON: Head CT 7/13/2016.    FINDINGS: There is mild diffuse cerebral volume loss. There are subtle  patchy areas of decreased density in the cerebral white matter  bilaterally that are consistent with sequela of chronic small vessel  ischemic disease.    The ventricles and basal cisterns are within normal limits in  configuration given the degree of cerebral volume loss. There is no  midline shift. There are no extra-axial fluid collections.     No intracranial hemorrhage, mass or recent infarct.    The visualized paranasal sinuses are well-aerated. There is no  mastoiditis. There are no fractures of the visualized bones.       Impression    IMPRESSION: Diffuse cerebral volume loss and cerebral white matter  changes consistent with chronic small vessel ischemic disease. No  evidence for acute intracranial pathology.               Treatments provided: 1L NS fluid bolus, zosyn IV, zithromax to follow, robbins placed due to retention    Family Comments: none present currently, son is said to  be on his way here    OBS brochure/video discussed/provided to patient/family: Yes              Name of person given brochure if not patient:               Relationship to patient:     ED Medications:   Medications   0.9% sodium chloride BOLUS (0 mLs Intravenous Stopped 12/19/18 1349)     Followed by   sodium chloride 0.9% infusion (not administered)   azithromycin (ZITHROMAX) 500 mg in sodium chloride 0.9 % 250 mL intermittent infusion (500 mg Intravenous New Bag 12/19/18 1438)   piperacillin-tazobactam (ZOSYN) 4.5 g vial to attach to  mL bag (0 g Intravenous Stopped 12/19/18 1434)   lidocaine 2 % (URO-JET) jelly 10 mL (10 mLs Urethral Given 12/19/18 1350)       Drips infusing?:  Yes    For the majority of the shift this patient was Green.   Interventions performed were NA.    Severe Sepsis OR Septic Shock Diagnosis Present: No    To be done/followed up on inpatient unit:      ED NURSE PHONE NUMBER: [phone]

## 2018-12-19 NOTE — PHARMACY-ADMISSION MEDICATION HISTORY
Admission medication history interview status for the 12/19/2018  admission is complete. See EPIC admission navigator for prior to admission medications     Medication history source reliability:Moderate    Actions taken by pharmacist (provider contacted, etc):  Spoke w/ patient.  Reviewed patient chart.      Additional medication history information not noted on PTA med list :    - Patient was prescribed Amoxicillin 500 mg PO TID x 10 days on 12/18/18; however, patient states he never started this prescription.   - Per patient he has his chromogenic factor X checked once a month with a goal range of 20-40.    Medication reconciliation/reorder completed by provider prior to medication history? No    Time spent in this activity: 20 minutes    Prior to Admission medications    Medication Sig Last Dose Taking? Auth Provider   acetaminophen (TYLENOL) 500 MG tablet Take 500 mg by mouth 4 times daily  12/19/2018 at am Yes Reported, Patient   carbidopa-levodopa (SINEMET)  MG per tablet Take 1 tablet by mouth 3 times daily 0400; 1100; 1700 12/19/2018 at 0400 Yes Unknown, Entered By History   traMADol (ULTRAM) 50 MG tablet Take 50 mg by mouth every 6 hours as needed for moderate pain  at prn Yes Reported, Patient   warfarin (COUMADIN) 5 MG tablet Take 5 mg by mouth daily 12/19/2018 at am Yes Unknown, Entered By History     Kathleen Carver, PharmD, BCPS

## 2018-12-19 NOTE — ED NOTES
Bed: ED28  Expected date:   Expected time:   Means of arrival:   Comments:  [facility] [place] - 418 -84m weakness eta 111

## 2018-12-19 NOTE — ED PROVIDER NOTES
Emergency Department Attending Supervision Note  12/19/2018  1:49 PM      I evaluated this patient in conjunction with Beata Krueger CNP       Briefly, the patient presented with generalized weakness in the setting of a possible infectious illness.  Patient previously seen and diagnosed with a possible viral URI.  Today he was noted to have fever of 101.9.  Patient endorses a cough but denies any significant chest pain shortness of breath abdominal pain.  He notes some discomfort in his bilateral legs but no focal weakness.    On my exam, patient appears nontoxic but does have shaking chills.  Lungs are clear and equal to auscultation bilaterally no rales rhonchi or wheezing.  No increased work of breathing.  Heart sounds are regular rate and rhythm no murmurs rubs or gallops and peripheral pulses are 2+ and symmetric bilaterally.  Abdomen is soft nontender nondistended no guarding or rebound.    Results:  All ED results are reviewed by myself.  Complete chemistry unremarkable.  Pro-calcitonin 0.31.  Troponin I 0.042.  Lactic acid 0.6.  VBG reveals normal pH.  Urinalysis without pyuria.  Rapid influenza testing negative.  Possible left retrocardiac opacity on chest x-ray.  No other acute thoracic findings. CT head without acute findings.    ED course:  Patient was seen and evaluated with Beata Krueger CNP. Workup obtained as above. Patient given IV abx in ED for presumed pneumonia and admitted to the hospitalist in stable condition.    My impression is left retrocardiac pneumonia, generalized weakness.        Diagnosis    ICD-10-CM    1. Pneumonia J18.9 Procalcitonin     INR     INR     CANCELED: INR   2. Generalized muscle weakness M62.81          Israel Jacobsen MD, MD  12/20/18 0979

## 2018-12-19 NOTE — PROGRESS NOTES
RECEIVING UNIT ED HANDOFF REVIEW    ED Nurse Handoff Report was reviewed by: Georgie Lyons on December 19, 2018 at 4:42 PM

## 2018-12-20 LAB
ERYTHROCYTE [DISTWIDTH] IN BLOOD BY AUTOMATED COUNT: 13.7 % (ref 10–15)
FACT X ACT/NOR PPP CHRO: 26 % (ref 70–130)
HCT VFR BLD AUTO: 36.9 % (ref 40–53)
HGB BLD-MCNC: 12.7 G/DL (ref 13.3–17.7)
MCH RBC QN AUTO: 32.8 PG (ref 26.5–33)
MCHC RBC AUTO-ENTMCNC: 34.4 G/DL (ref 31.5–36.5)
MCV RBC AUTO: 95 FL (ref 78–100)
PLATELET # BLD AUTO: 144 10E9/L (ref 150–450)
PROCALCITONIN SERPL-MCNC: 1.6 NG/ML
RBC # BLD AUTO: 3.87 10E12/L (ref 4.4–5.9)
TROPONIN I SERPL-MCNC: 0.31 UG/L (ref 0–0.04)
TROPONIN I SERPL-MCNC: 0.33 UG/L (ref 0–0.04)
WBC # BLD AUTO: 10.1 10E9/L (ref 4–11)

## 2018-12-20 PROCEDURE — 85260 CLOT FACTOR X STUART-POWER: CPT | Performed by: HOSPITALIST

## 2018-12-20 PROCEDURE — 85027 COMPLETE CBC AUTOMATED: CPT | Performed by: HOSPITALIST

## 2018-12-20 PROCEDURE — A9270 NON-COVERED ITEM OR SERVICE: HCPCS | Mod: GY | Performed by: HOSPITALIST

## 2018-12-20 PROCEDURE — 99221 1ST HOSP IP/OBS SF/LOW 40: CPT | Performed by: INTERNAL MEDICINE

## 2018-12-20 PROCEDURE — 12000000 ZZH R&B MED SURG/OB

## 2018-12-20 PROCEDURE — 84484 ASSAY OF TROPONIN QUANT: CPT | Performed by: HOSPITALIST

## 2018-12-20 PROCEDURE — 36415 COLL VENOUS BLD VENIPUNCTURE: CPT | Performed by: HOSPITALIST

## 2018-12-20 PROCEDURE — 99233 SBSQ HOSP IP/OBS HIGH 50: CPT | Performed by: HOSPITALIST

## 2018-12-20 PROCEDURE — 93005 ELECTROCARDIOGRAM TRACING: CPT

## 2018-12-20 PROCEDURE — 25000128 H RX IP 250 OP 636: Performed by: HOSPITALIST

## 2018-12-20 PROCEDURE — 25000132 ZZH RX MED GY IP 250 OP 250 PS 637: Mod: GY | Performed by: HOSPITALIST

## 2018-12-20 PROCEDURE — 84145 PROCALCITONIN (PCT): CPT | Performed by: HOSPITALIST

## 2018-12-20 PROCEDURE — 25000128 H RX IP 250 OP 636: Performed by: NURSE PRACTITIONER

## 2018-12-20 PROCEDURE — 93010 ELECTROCARDIOGRAM REPORT: CPT | Performed by: INTERNAL MEDICINE

## 2018-12-20 RX ORDER — WARFARIN SODIUM 5 MG/1
5 TABLET ORAL
Status: COMPLETED | OUTPATIENT
Start: 2018-12-20 | End: 2018-12-20

## 2018-12-20 RX ADMIN — WARFARIN SODIUM 5 MG: 5 TABLET ORAL at 17:37

## 2018-12-20 RX ADMIN — AZITHROMYCIN 250 MG: 250 TABLET, FILM COATED ORAL at 09:21

## 2018-12-20 RX ADMIN — PIPERACILLIN SODIUM,TAZOBACTAM SODIUM 3.38 G: 3; .375 INJECTION, POWDER, FOR SOLUTION INTRAVENOUS at 09:21

## 2018-12-20 RX ADMIN — PIPERACILLIN SODIUM,TAZOBACTAM SODIUM 3.38 G: 3; .375 INJECTION, POWDER, FOR SOLUTION INTRAVENOUS at 01:51

## 2018-12-20 RX ADMIN — PIPERACILLIN SODIUM,TAZOBACTAM SODIUM 3.38 G: 3; .375 INJECTION, POWDER, FOR SOLUTION INTRAVENOUS at 19:45

## 2018-12-20 RX ADMIN — SENNOSIDES AND DOCUSATE SODIUM 1 TABLET: 8.6; 5 TABLET ORAL at 09:21

## 2018-12-20 RX ADMIN — CARBIDOPA AND LEVODOPA 1 TABLET: 25; 250 TABLET ORAL at 09:45

## 2018-12-20 RX ADMIN — CARBIDOPA AND LEVODOPA 1 TABLET: 25; 250 TABLET ORAL at 17:37

## 2018-12-20 RX ADMIN — ASPIRIN 325 MG: 325 TABLET, DELAYED RELEASE ORAL at 09:56

## 2018-12-20 RX ADMIN — SODIUM CHLORIDE 1000 ML: 9 INJECTION, SOLUTION INTRAVENOUS at 15:53

## 2018-12-20 RX ADMIN — SODIUM CHLORIDE 1000 ML: 9 INJECTION, SOLUTION INTRAVENOUS at 05:38

## 2018-12-20 RX ADMIN — PIPERACILLIN SODIUM,TAZOBACTAM SODIUM 3.38 G: 3; .375 INJECTION, POWDER, FOR SOLUTION INTRAVENOUS at 13:30

## 2018-12-20 ASSESSMENT — ACTIVITIES OF DAILY LIVING (ADL)
ADLS_ACUITY_SCORE: 15
ADLS_ACUITY_SCORE: 15
ADLS_ACUITY_SCORE: 19
ADLS_ACUITY_SCORE: 15
ADLS_ACUITY_SCORE: 15
ADLS_ACUITY_SCORE: 19

## 2018-12-20 ASSESSMENT — MIFFLIN-ST. JEOR: SCORE: 1608.25

## 2018-12-20 NOTE — PROGRESS NOTES
hospitalist X cover  Called by RN for lethargy  Pt with temp 102 currently  Admission for pneumonia, +CXR, new cough  Neg influenza  On abx  BS nl now  On exam  Pt lethargic  Bradykinetic  Responds appropriately to simple questions  Slowed mentation  Follows simple commands  Moves all 4 extrem, face symmetric  Pt is warm. Breathing easily.   Tramadol on   Unclear if this is different from time of admission  Nl head ct  Plan   tx pneumonia  Npo for now  Discontinue prn tramadol  Neuro check  Repeat Head cT as unclear if this is a change from time of admission  Discussed with rn  Rectal tylenol to tx fever  Luis Esteves MD

## 2018-12-20 NOTE — PLAN OF CARE
Pt more alert today and talking more. Was able to take his pills crushed with some applesauce while sitting in the chair. He took some sips of water and appears to be swallowing ok. Congested nonprod cough, few crackles heard at bases. VSS. Pt has no C/o of discomfort. Required lift to get out of bed and to turn.

## 2018-12-20 NOTE — PLAN OF CARE
"PT: Order received; Per chart review, patient limited by lethargy; CT negative; awaiting a cardiology consult for \"nstemi, lat Twave inv, tropon .063--> 0.307 adm w/ pneumonia, PE history\". Will hold PT evaluation until Cardiology recommendations are known.  "

## 2018-12-20 NOTE — PROVIDER NOTIFICATION
MD Notification    Notified Person: MD    Notified Person Name: Linn    Notification Date/Time: 12/19 1945    Notification Interaction: Paged    Purpose of Notification: Pt lethargic,unsafe to take PO meds    Orders Received: MD assessed pt.    Comments:

## 2018-12-20 NOTE — PROGRESS NOTES
SW:  D:  Received a phone call today from Velma Ayala, patient's spouse's [employer] hospice social worker, [phone].  She was at spouse's apartment and called writer due to a concern of the spouse.  Spouse is concerned that the medical team here acquire patient's records from his neurology clinic and his PCP.  Explained to Velma that we have received his neurology records and that Dr Conde explains he can access patient's [facility] records thru Chart Everywhere.    While Velma was on the phone, writer obtained the follow information.  Patient and his spouse live in a independent apartment at [address] in [place], thus the address in patient's profile is not his address.  Velma explains the spouse has a medical condition which has left her unable to speak however has not affected her mental clarity.  Due to HIPPA, will not document spouses medical condtion.  Spouse communicates in writing and texting.  Because we only had the  spouse's name on the Emergency Contact list, writer asked Velma if there are other family members to communicate via the phone.    Velma shares there are three children, two local sons, Estrada and Gus and a daughter Jocelyn Guzman who lives in [place], [place].  Velma stated patient and spouse both have health issues but by helping each other they manage.  While patient is here, there is a team of friends that are helping spouse.   Given that patient has pneumonia and Parkinson's, Velma has alerted spouse that patient may require a TCU before coming home.  Wife's preference for TCU, per Velma, is [facility].    Writer met briefly with patient and asked him if he wants his children added since we cannot communicate by phone with his wife. Velma had the numbers for son Gus and daughter Jocelyn but now Estrada.   He gave permission to add the children.  Writer added the names and numbers.  Plan:  Will follow for discharge planning.  Will keep Velma updated because patient's discharge plan will impact length of  time spouse needs support.

## 2018-12-20 NOTE — CONSULTS
Inpatient Cardiology Consultation   [facility]  Date of Admission:12/19/2018  Date of consult: 12/20/2018      RECOMMENDATIONS:  Inpatient cardiology consultation note has been dictated. Dictation number [id number]          REVIEW OF SYSTEMS:  A comprehensive 10 point review of systems was completed and the pertinent positives are documented in history of present illness.    MEDICATIONS:  Prior to Admission Medications   Prescriptions Last Dose Informant Patient Reported? Taking?   acetaminophen (TYLENOL) 500 MG tablet 12/19/2018 at am Self Yes Yes   Sig: Take 500 mg by mouth 4 times daily    carbidopa-levodopa (SINEMET)  MG per tablet 12/19/2018 at 0400 Self Yes Yes   Sig: Take 1 tablet by mouth 3 times daily 0400; 1100; 1700   traMADol (ULTRAM) 50 MG tablet  at prn Self Yes Yes   Sig: Take 50 mg by mouth every 6 hours as needed for moderate pain   warfarin (COUMADIN) 5 MG tablet 12/19/2018 at am Self Yes Yes   Sig: Take 5 mg by mouth daily      Facility-Administered Medications: None       ALLERGIES:  Allergies   Allergen Reactions     Oxycodone Anaphylaxis     Fentanyl Nausea     Propofol Nausea and Vomiting       PAST MEDICAL HISTORY:  Past Medical History:   Diagnosis Date     Degenerative joint disease      Gastro-oesophageal reflux disease      GERD (gastroesophageal reflux disease)      History of BPH      Hypertension      Osteoarthritis      Parkinson disease (H)      Parkinsons disease (H)      Plantar fasciitis of right foot      PONV (postoperative nausea and vomiting)      Prostate nodule with urinary obstruction 7-9-12     Pulmonary embolism (H)      Spinal stenosis      Tendonitis, Achilles, right      Unspecified cerebral artery occlusion with cerebral infarction        PAST SURGICAL HISTORY:  Past Surgical History:   Procedure Laterality Date     C REMOVAL OF KIDNEY STONE       C TOTAL KNEE ARTHROPLASTY  2011     HERNIORRHAPHY INGUINAL       INCISION LIMBAL RELAXING, KERATOTOMY  ARCUATE  1/20/2014    Procedure: INCISION LIMBAL RELAXING, KERATOTOMY ARCUATE;;  Surgeon: Rosalio Montoya MD;  Location: [facility]     INCISION LIMBAL RELAXING, KERATOTOMY ARCUATE  2/10/2014    Procedure: INCISION LIMBAL RELAXING, KERATOTOMY ARCUATE;;  Surgeon: Rosalio Montoya MD;  Location: [facility]     LAMINECTOMY LUMBAR MINIMALLY INVASIVE ONE LEVEL       LAMINECTOMY, FUSION LUMBAR TWO LEVELS, COMBINED      L3-L5     PHACOEMULSIFICATION CLEAR CORNEA WITH STANDARD INTRAOCULAR LENS IMPLANT  1/20/2014    Procedure: PHACOEMULSIFICATION CLEAR CORNEA WITH STANDARD INTRAOCULAR LENS IMPLANT;  RIGHT PHACOEMULSIFICATION CLEAR CORNEA WITH STANDARD INTRAOCULAR LENS IMPLANT, LIMBRAL RELAXING INCISION  ;  Surgeon: Rosalio Montoya MD;  Location: [facility]     PHACOEMULSIFICATION CLEAR CORNEA WITH STANDARD INTRAOCULAR LENS IMPLANT  2/10/2014    Procedure: PHACOEMULSIFICATION CLEAR CORNEA WITH STANDARD INTRAOCULAR LENS IMPLANT;  LEFT PHACOEMULSIFICATION CLEAR CORNEA WITH STANDARD INTRAOCULAR LENS IMPLANT WITH LIMBAL RELAXING INCISION;  Surgeon: Rosalio Montoya MD;  Location: [facility]     REVERSE ARTHROPLASTY SHOULDER  12/4/2012    Procedure: REVERSE ARTHROPLASTY SHOULDER;  LEFT REVERSE TOTAL SHOULDER ARTHROPLASTY;  Surgeon: Tre Patel MD;  Location:  [facility]     TONSILLECTOMY         SOCIAL HISTORY:   Russ Loera  reports that  has never smoked. he has never used smokeless tobacco. He reports that he does not drink alcohol or use drugs.    FAMILY HISTORY:  History reviewed. No pertinent family history.    PHYSICAL EXAMINATION:  Temp: 98  F (36.7  C) Temp src: Oral BP: 118/70 Pulse: 83 Heart Rate: 70 Resp: 20 SpO2: 92 % O2 Device: None (Room air)    12/15 1500 - 12/20 1459  In: 1100   Out: 1550 [Urine:1550]  Net: -450  Vitals:    12/19/18 1130 12/19/18 1844 12/20/18 0647   Weight: 89.4 kg (197 lb) 91.7 kg (202 lb 3.2 oz) 91.2 kg (201 lb 1 oz)           Susu Pate MD

## 2018-12-20 NOTE — PHARMACY-ANTICOAGULATION SERVICE
Clinical Pharmacy - Warfarin Dosing Consult     Pharmacy has been consulted to manage this patient s warfarin therapy.    Home dose of warfarin 5 mg daily.    Chromogenic Factor 10 12/18:  30       INR   Date Value Ref Range Status   12/19/2018 2.05 (H) 0.86 - 1.14 Final   11/12/2016 2.24 (H) 0.86 - 1.14 Final     Chromogenic Factor 10   Date Value Ref Range Status   11/12/2016 21 (L) 70 - 130 % Final     Comment:     Therapeutic Range:  A Chromogenic Factor 10 level of approximately 20-40%   inversely correlates with an INR of 2-3 for patients receiving Warfarin.   Chromogenic Factor 10 levels below 20% indicate an INR greater than 3 and   levels above 40% indicate an INR less than 2.       Dose of 5 mg given today at home.  No dose today.  Recommend warfarin 0 mg today.  Pharmacy will monitor Russ Loera daily and order warfarin doses to achieve specified goal.      Please contact pharmacy as soon as possible if the warfarin needs to be held for a procedure or if the warfarin goals change.      December 19, 2018  Rico Dominique RPh.

## 2018-12-20 NOTE — PLAN OF CARE
Disoriented to situation, time - CORKY well. VSS - ex. febrile. NPO - aspiration precautions, unable to assess swallow ability. Lethargic, MD notified and saw pt - ordered head CT. Assist 2 + lift. Cummins patent. RLL coarse, diminished bilaterally. [place]. R PIV  + abx. Discharge pending.

## 2018-12-21 ENCOUNTER — APPOINTMENT (OUTPATIENT)
Dept: SPEECH THERAPY | Facility: CLINIC | Age: 83
DRG: 178 | End: 2018-12-21
Attending: HOSPITALIST
Payer: MEDICARE

## 2018-12-21 ENCOUNTER — APPOINTMENT (OUTPATIENT)
Dept: CARDIOLOGY | Facility: CLINIC | Age: 83
DRG: 178 | End: 2018-12-21
Attending: INTERNAL MEDICINE
Payer: MEDICARE

## 2018-12-21 LAB
ANION GAP SERPL CALCULATED.3IONS-SCNC: 8 MMOL/L (ref 3–14)
BUN SERPL-MCNC: 22 MG/DL (ref 7–30)
CALCIUM SERPL-MCNC: 7.9 MG/DL (ref 8.5–10.1)
CHLORIDE SERPL-SCNC: 115 MMOL/L (ref 94–109)
CO2 SERPL-SCNC: 22 MMOL/L (ref 20–32)
CREAT SERPL-MCNC: 1.17 MG/DL (ref 0.66–1.25)
ERYTHROCYTE [DISTWIDTH] IN BLOOD BY AUTOMATED COUNT: 13.8 % (ref 10–15)
FACT X ACT/NOR PPP CHRO: 27 % (ref 70–130)
GFR SERPL CREATININE-BSD FRML MDRD: 57 ML/MIN/{1.73_M2}
GLUCOSE SERPL-MCNC: 93 MG/DL (ref 70–99)
HCT VFR BLD AUTO: 34.9 % (ref 40–53)
HGB BLD-MCNC: 12 G/DL (ref 13.3–17.7)
MAGNESIUM SERPL-MCNC: 2 MG/DL (ref 1.6–2.3)
MCH RBC QN AUTO: 32.6 PG (ref 26.5–33)
MCHC RBC AUTO-ENTMCNC: 34.4 G/DL (ref 31.5–36.5)
MCV RBC AUTO: 95 FL (ref 78–100)
PLATELET # BLD AUTO: 139 10E9/L (ref 150–450)
POTASSIUM SERPL-SCNC: 3.3 MMOL/L (ref 3.4–5.3)
POTASSIUM SERPL-SCNC: 3.6 MMOL/L (ref 3.4–5.3)
PROCALCITONIN SERPL-MCNC: 1.36 NG/ML
RBC # BLD AUTO: 3.68 10E12/L (ref 4.4–5.9)
SODIUM SERPL-SCNC: 145 MMOL/L (ref 133–144)
WBC # BLD AUTO: 9.1 10E9/L (ref 4–11)

## 2018-12-21 PROCEDURE — 83735 ASSAY OF MAGNESIUM: CPT | Performed by: HOSPITALIST

## 2018-12-21 PROCEDURE — 93306 TTE W/DOPPLER COMPLETE: CPT

## 2018-12-21 PROCEDURE — 12000000 ZZH R&B MED SURG/OB

## 2018-12-21 PROCEDURE — 93306 TTE W/DOPPLER COMPLETE: CPT | Mod: 26 | Performed by: INTERNAL MEDICINE

## 2018-12-21 PROCEDURE — 92526 ORAL FUNCTION THERAPY: CPT | Mod: GN | Performed by: SPEECH-LANGUAGE PATHOLOGIST

## 2018-12-21 PROCEDURE — 84132 ASSAY OF SERUM POTASSIUM: CPT | Performed by: HOSPITALIST

## 2018-12-21 PROCEDURE — 85260 CLOT FACTOR X STUART-POWER: CPT | Performed by: HOSPITALIST

## 2018-12-21 PROCEDURE — 25000128 H RX IP 250 OP 636: Performed by: NURSE PRACTITIONER

## 2018-12-21 PROCEDURE — 40000225 ZZH STATISTIC SLP WARD VISIT: Performed by: SPEECH-LANGUAGE PATHOLOGIST

## 2018-12-21 PROCEDURE — 84145 PROCALCITONIN (PCT): CPT | Performed by: HOSPITALIST

## 2018-12-21 PROCEDURE — 36415 COLL VENOUS BLD VENIPUNCTURE: CPT | Performed by: HOSPITALIST

## 2018-12-21 PROCEDURE — 80048 BASIC METABOLIC PNL TOTAL CA: CPT | Performed by: HOSPITALIST

## 2018-12-21 PROCEDURE — 92610 EVALUATE SWALLOWING FUNCTION: CPT | Mod: GN | Performed by: SPEECH-LANGUAGE PATHOLOGIST

## 2018-12-21 PROCEDURE — 25000132 ZZH RX MED GY IP 250 OP 250 PS 637: Mod: GY | Performed by: HOSPITALIST

## 2018-12-21 PROCEDURE — 99233 SBSQ HOSP IP/OBS HIGH 50: CPT | Performed by: HOSPITALIST

## 2018-12-21 PROCEDURE — 85027 COMPLETE CBC AUTOMATED: CPT | Performed by: HOSPITALIST

## 2018-12-21 PROCEDURE — 25000128 H RX IP 250 OP 636: Performed by: HOSPITALIST

## 2018-12-21 PROCEDURE — A9270 NON-COVERED ITEM OR SERVICE: HCPCS | Mod: GY | Performed by: HOSPITALIST

## 2018-12-21 RX ORDER — POTASSIUM CHLORIDE 29.8 MG/ML
20 INJECTION INTRAVENOUS
Status: DISCONTINUED | OUTPATIENT
Start: 2018-12-21 | End: 2018-12-23 | Stop reason: HOSPADM

## 2018-12-21 RX ORDER — POTASSIUM CHLORIDE 1500 MG/1
20-40 TABLET, EXTENDED RELEASE ORAL
Status: DISCONTINUED | OUTPATIENT
Start: 2018-12-21 | End: 2018-12-23 | Stop reason: HOSPADM

## 2018-12-21 RX ORDER — POTASSIUM CHLORIDE 1.5 G/1.58G
20-40 POWDER, FOR SOLUTION ORAL
Status: DISCONTINUED | OUTPATIENT
Start: 2018-12-21 | End: 2018-12-23 | Stop reason: HOSPADM

## 2018-12-21 RX ORDER — POTASSIUM CL/LIDO/0.9 % NACL 10MEQ/0.1L
10 INTRAVENOUS SOLUTION, PIGGYBACK (ML) INTRAVENOUS
Status: DISCONTINUED | OUTPATIENT
Start: 2018-12-21 | End: 2018-12-23 | Stop reason: HOSPADM

## 2018-12-21 RX ORDER — MAGNESIUM SULFATE HEPTAHYDRATE 40 MG/ML
4 INJECTION, SOLUTION INTRAVENOUS EVERY 4 HOURS PRN
Status: DISCONTINUED | OUTPATIENT
Start: 2018-12-21 | End: 2018-12-23 | Stop reason: HOSPADM

## 2018-12-21 RX ORDER — WARFARIN SODIUM 5 MG/1
5 TABLET ORAL
Status: COMPLETED | OUTPATIENT
Start: 2018-12-21 | End: 2018-12-21

## 2018-12-21 RX ORDER — POTASSIUM CHLORIDE 7.45 MG/ML
10 INJECTION INTRAVENOUS
Status: DISCONTINUED | OUTPATIENT
Start: 2018-12-21 | End: 2018-12-23 | Stop reason: HOSPADM

## 2018-12-21 RX ADMIN — AZITHROMYCIN 250 MG: 250 TABLET, FILM COATED ORAL at 08:35

## 2018-12-21 RX ADMIN — SODIUM CHLORIDE 1000 ML: 9 INJECTION, SOLUTION INTRAVENOUS at 00:26

## 2018-12-21 RX ADMIN — PIPERACILLIN SODIUM,TAZOBACTAM SODIUM 3.38 G: 3; .375 INJECTION, POWDER, FOR SOLUTION INTRAVENOUS at 15:01

## 2018-12-21 RX ADMIN — PIPERACILLIN SODIUM,TAZOBACTAM SODIUM 3.38 G: 3; .375 INJECTION, POWDER, FOR SOLUTION INTRAVENOUS at 19:57

## 2018-12-21 RX ADMIN — PIPERACILLIN SODIUM,TAZOBACTAM SODIUM 3.38 G: 3; .375 INJECTION, POWDER, FOR SOLUTION INTRAVENOUS at 02:25

## 2018-12-21 RX ADMIN — POTASSIUM CHLORIDE 40 MEQ: 1500 TABLET, EXTENDED RELEASE ORAL at 10:25

## 2018-12-21 RX ADMIN — CARBIDOPA AND LEVODOPA 1 TABLET: 25; 250 TABLET ORAL at 05:24

## 2018-12-21 RX ADMIN — WARFARIN SODIUM 5 MG: 5 TABLET ORAL at 17:01

## 2018-12-21 RX ADMIN — POTASSIUM CHLORIDE 20 MEQ: 1500 TABLET, EXTENDED RELEASE ORAL at 12:39

## 2018-12-21 RX ADMIN — SENNOSIDES AND DOCUSATE SODIUM 2 TABLET: 8.6; 5 TABLET ORAL at 19:57

## 2018-12-21 RX ADMIN — CARBIDOPA AND LEVODOPA 1 TABLET: 25; 250 TABLET ORAL at 16:56

## 2018-12-21 RX ADMIN — PIPERACILLIN SODIUM,TAZOBACTAM SODIUM 3.38 G: 3; .375 INJECTION, POWDER, FOR SOLUTION INTRAVENOUS at 08:35

## 2018-12-21 RX ADMIN — CARBIDOPA AND LEVODOPA 1 TABLET: 25; 250 TABLET ORAL at 10:25

## 2018-12-21 ASSESSMENT — ACTIVITIES OF DAILY LIVING (ADL)
ADLS_ACUITY_SCORE: 15

## 2018-12-21 ASSESSMENT — MIFFLIN-ST. JEOR: SCORE: 1621.25

## 2018-12-21 NOTE — PLAN OF CARE
A&O x 3, forgetful, slow to respond, [place], vss, BP 150s/ 90s. MD aware, no c/o of pain, Up with assist of 2, walker and GB, LS crackles bases, ocasional congested cough, robbins discontinued, voiding fine, uses urinals, had incontinent loose BM x 1, speech and PT following,diet advanced to DD2 with HTL, replaced potassium, recheck will be at 1800, takes pills crushed with apple sauce.

## 2018-12-21 NOTE — PROGRESS NOTES
CLINICAL SWALLOW EVALUATION       12/21/18 1400   General Information   Onset Date 12/19/18   Start of Care Date 12/21/18   Patient Profile Review/OT: Additional Occupational Profile Info See Profile for full history and prior level of function   Patient/Family Goals Statement Patient would like something to drink.    Swallowing Evaluation Bedside swallow evaluation   Behaviorial Observations Alert;Confused   Mode of current nutrition NPO   Comments Patient admitted with left lung pneumonia, hx of recent falls at home, Parkinson's disease, GERD and HTN.  Patient reports difficulty swallowing his cold cereal in the mornings, feeling like it goes down the wrong way when he's chewing.    Clinical Swallow Evaluation   Oral Musculature anomalies present  (Tremor)   Structural Abnormalities none present   Dentition present and adequate   Mucosal Quality good   Mandibular Strength and Mobility intact   Oral Labial Strength and Mobility impaired retraction;impaired pursing;impaired seal;impaired coordination   Lingual Strength and Mobility impaired protrusion;impaired anterior elevation;impaired left lateral movement;impaired right lateral movement;impaired coordination   Velar Elevation intact   Buccal Strength and Mobility intact   Laryngeal Function Cough;Throat clear;Swallow;Voicing initiated;Dry swallow palpated   Oral Musculature Comments Mild to moderate generalized weakness, tremor   Additional Documentation Yes   Clinical Swallow Eval: Thin Liquid Texture Trial   Mode of Presentation, Thin Liquids cup;self-fed;spoon   Volume of Liquid or Food Presented 4 oz   Oral Phase of Swallow Premature pharyngeal entry   Pharyngeal Phase of Swallow impaired;repeated swallows;throat clearing;wet vocal quality after swallow   Diagnostic Statement Impulsive drinking consecutive swallows, delayed throat clear and wet quality.    Clinical Swallow Eval: Nectar Thick Liquid Texture Trial   Mode of Presentation, Nectar  cup;spoon;self-fed;fed by clinician   Volume of Nectar Presented 2 oz   Oral Phase, Nectar Premature pharyngeal entry   Pharyngeal Phase, Nectar impaired;coughing/choking;repeated swallows;wet vocal quality after swallow   Diagnostic Statement Wet quality despite double swallows   Clinical Swallow Eval: Honey Thick Liquid Texture Trial   Mode of Presentation, Honey spoon;fed by clinician;cup   Volume of Honey Presented 3 oz   Oral Phase, Honey WFL   Pharyngeal Phase, Honey repeated swallows;impaired   Diagnostic Statement Wet quality with liquids by cup, tolerated well by spoon   Clinical Swallow Eval: Puree Solid Texture Trial   Mode of Presentation, Puree spoon;self-fed   Volume of Puree Presented 3 tsp   Oral Phase, Puree WFL   Pharyngeal Phase, Puree intact   Diagnostic Statement No overt Sx of aspiration   Clinical Swallow Eval: Solid Food Texture Trial   Mode of Presentation, Solid self-fed   Volume of Solid Food Presented 1 cracker   Oral Phase, Solid Residue in oral cavity;Poor AP movement   Oral Residue, Solid left anterior lateral sulci;right anterior lateral sulci;mid posterior tongue;other (see comments)  (Teeth and hard palate)   Pharyngeal Phase, Solid intact   Diagnostic Statement No overt Sx of aspiration, difficult oral clearance.    Esophageal Phase of Swallow   Esophageal comments Belching may be related to premature pharyngeal entry with liquids.    Swallow Eval: Clinical Impressions   Skilled Criteria for Therapy Intervention Skilled criteria met.  Treatment indicated.   Functional Assessment Scale (FAS) 3   Dysphagia Outcome Severity Scale (CHANCE) Level 3 - CHANCE   Treatment Diagnosis Moderate oropharyngeal dysphagia   Diet texture recommendations Dysphagia diet level 2;Honey thick liquids  (by spoon)   Recommended Feeding/Eating Techniques maintain upright posture during/after eating for 30 mins;hard swallow w/ each bite or sip;small sips/bites;other (see comments)  (Double swallow, needs   cueing)   Demonstrates Need for Referral to Another Service occupational therapy;physical therapy;social work   Therapy Frequency 5 times/wk   Predicted Duration of Therapy Intervention (days/wks) 1 week   Anticipated Discharge Disposition inpatient rehabilitation facility   Risks and Benefits of Treatment have been explained. Yes   Patient, family and/or staff in agreement with Plan of Care Yes   Clinical Impression Comments Patient presents with overt Sx of aspiration with thin and nectar thick liquids, sx of esophageal dysphagia present as well with belching after swallow.  Recommend dysphagia diet level 2 with honey thick liquids by spoon, intermittent feeding assistance and cueing required at this time.    Total Evaluation Time   Total Evaluation Time (Minutes) 25

## 2018-12-21 NOTE — PROGRESS NOTES
SW:  D:  Writer anticipates patient will require TCU prior to retuning home.  W[facility]'s preference [facility].  [facility]   P: Will await therapy recommendations.  Once known will communicate with one of patient's adult children as wife is unable to speak.

## 2018-12-21 NOTE — CONSULTS
INPATIENT CARDIOLOGY CONSULTATION   [facility].   Consult Date:  12/20/2018     REFERRAL SOURCE:  Brian Conde MD, Hospitalist Service.      REASON FOR VISIT:  Mild troponin elevation in the context of active pneumonia.      HISTORY OF PRESENT ILLNESS:    The patient is new to Cardiology.  He is a frail 84-year-old gentleman who was admitted yesterday with a high-grade fever, cough, leukocytosis and chest x-ray evidence of pneumonia.  Antibiotics have been started and he has improved.  Previously, he has a history of pulmonary embolus and is on long-term warfarin anticoagulation with an admission INR of 2.0.  He is also known to have Parkinson disease and has apparent cognitive impairment (not sure what his baseline is).  More recently he has had recurrent falls and a CT head showed moderate volume loss and chronic small-vessel ischemic change but no evidence of acute ischemia, hemorrhage or mass.      It is not clear why his troponin was checked, but it was borderline elevated at 0.042--0.307--0.328.   The patient has not had any chest pain or hemodynamic instability.  His serial ECGs have shown sinus rhythm with a fixed and nonspecific T-wave inversion in the lateral leads (V4-V6).  Other labs have been sodium 141, potassium 4.0, creatinine 1.0.  Normal liver enzymes.  Hemoglobin 12.7.      PHYSICAL EXAMINATION:   VITAL SIGNS:  /70, pulse 83 per minute, respiratory rate 20 per minute, sats 92% on room air, BMI 28.8 kg/m squared.   CONSTITUTIONAL:  Mildly obese body habitus, comfortable at rest without conversational dyspnea but has apparent cognitive impairment.  He is not entirely sure where he is.    RESPIRATORY:  He has a few rales at the bases.  No wheeze.   CARDIOVASCULAR:  Regular heart sounds.  No audible murmur.   EXTREMITIES:  Minimal to no lower extremity edema.      DIAGNOSES:   1.  Mild troponin elevation, asymptomatic.    2.  Acute pneumonia.   3.  Prior history of  pulmonary embolus -- on long-term warfarin anticoagulation.   4.  Recurrent falls.   5.  Parkinson disease.      ASSESSMENT/PLAN:    The patient does have a mild troponin elevation with non-specific ECG changes but no symptoms concerning for coronary ischemia.  His presenting symptoms are consistent with his empirical diagnosis of pneumonia and he is responding favorably to antibiotics.  I recommend proceeding with a noninvasive assessment first.      1.  Transthoracic echocardiogram to assess for wall motion abnormalities.   2.  In the absence of clinical instability, I will arrange for him to have a pharmacological nuclear stress test in 6-8 weeks with a subsequent outpatient followup.   3.  I do not think heparin or other antianginal therapy is indicated.   4.  He is on anticoagulation for previous pulmonary embolus and has recurrent falls.  The safety of this should be addressed.   5.  Cardiology will sign off.  Please page us with questions.  I will order the above cardiac testing and followup.         SUSU PATE MD             D: 2018   T: 2018   MT: KUN      Name:     RENALDO STOKES   MRN:      [id number]        Account:       [id number]   :      1934           Consult Date:  2018      Document: [id number]       cc: Susu Pate MD

## 2018-12-21 NOTE — PLAN OF CARE
Up A2 & lift. A/o2, ex time and situation; forgetful and confused at times. VSS. LS coarse/dim, w/slight crackles in bases. Encourage cough and deep breathe. Neuros: baseline numbness BLEs. [place]. [place] patent, w/good output. RPIV /hr & IV antibiotics. Diet NPO ex crushed meds in applesauce. Cont to monitor.

## 2018-12-21 NOTE — PLAN OF CARE
PT: Order received; Patient with MD at time of attempted session.  Attempted a 2nd time and MD still with patient and answering families questions.

## 2018-12-21 NOTE — PROGRESS NOTES
[facility]    Medicine Progress Note - Hospitalist Service       Date of Admission:  12/19/2018    Assessment & Plan       Russ Loera is a 84 year old male admitted on 12/19/2018 for cough and fever who was found to have retrocardiac opacity indicating a likely pneumonia. He was lethargic later on night of admission and CT head was actually repeated due to concern of change in mental status in conjunction with anticoagulated status and recent fall - no acute changes noted. Since then he has been stable and improved overall. As of 12/21, he has remained afebrile since night of 19th and is saturating well on RA.  On 12/21 his wife and home caregiver came in to see him and we discussed updates in detail.  It seems as though he is neurologically returned to baseline aside from generalized weakness.     Community acquired pneumonia, suspicion of aspiration pneumonia   - On adm: Left retrocardiac opacity, fever, cough; WBC normal range  - Influenza A/B antigen negative  - Afebrile last ~36 hours, last fever was overnight into 12/20 spiked temp 102.6, APAP given.  - blood cultures pending - NGTD  - procalc 0.31; 1.6 12/20 --> 1.36 12/21; trend on 12/22  - Started on azithro and zosyn in ed, continued, azithro set to end 12/24, zosyn end 12/21 with transition to augmentin for 4 more days - (total 7 days abx)  - Incentive spirometry  - prn albuterol nebs  - concern of aspiration - speech therapy consulted (patient and spouse confirm he eats fast and often coughs up food)    History of PE - on warfarin for anticoagulation  - Pharmacy to dose warfarin - (goal of chromogenic factor X 20-40%)  - RA sats good  - Adm VBG unremarkable; troponin 0.042, ordered tni recheck on day of admission - never drawn, will recheck this morning 12/20 to ensure no increase  - Given history of falls and Parkinson's - question safety of anticoagulation - await PT today, discussed with wife Nicolasa via caregiver Shanell on phone and  in person for a long time, explained ongoing consideration of stopping anticoag if he is in fact having recurrent falls.  - Continue to address/discuss pending PT eval - Don voiced that he may want to wait and talk with PCP on the issue.    Acute demand ischemia with mild, flat troponin elevation and T wave inversions  - Cardiology consult appreciated - echo ordered, lexiscan in several weeks  - no further eval felt necessary per Cards  - Echo 12/22: EF 55-60%, no WMAs, mild-mod MR    Recent falls  - Physical therapy consulted - no major cardiology testing planned, please evaluate and treat 12/21  - CT head completed on adm and repeated later that night 12/19 - chronic small vessel ischemic disease changes, no acute intracranial pathology     History of BPH  - Robbins placed in ED for inability to void  - Removed robbins, monitor for retention, scan and straight cath as needed  - not on pta meds, but care everywhere indicates previously on flomax - consider resuming if needed     History of Parkinsons Disease  - Continue PTA carbidopa-levodopa 25-250mg tid  - seems at baseline - per family on phone and in person, Don is near baseline with speech and movement in bed (gait not yet assessed - reportedly uses walker/cane at home)    Diet: await speech therapy  DVT Prophylaxis: Warfarin  Robbins Catheter: in place, indication:  BPH  Code Status: Full code        Disposition Plan: Expected discharge: Possibly ~2 days pending improvement of weakness and transition to oral antibiotics and PT evaluation  - need to ensure safety at home. TCU - [facility] pending. Family and pt aware and agree.  coordinating.     Brian Conde MD    Text Page (7am to 6pm, M-F)        The patient's care was discussed with the Bedside Nurse, Patient and Patient's Family.     Brian Conde MD  Hospitalist Service  [facility]    ______________________________________________________________________    Interval History   No  events overnight per nursing..Has no new complaints overnight.  He is more awake and alert and conversive today.  I spoke with Nicolasa his wife via caregiver Shanell via telephone and later on in person.  Seems down and is near baseline with his speech.  He continues to have quite sharp memory and is alert and oriented properly.      Data reviewed today: I reviewed all medications, new labs and imaging results over the last 24 hours. I personally reviewed no images or EKG's today.    Physical Exam   Vital Signs: Temp: 98.4  F (36.9  C) Temp src: Oral BP: (!) 155/95   Heart Rate: 70 Resp: 18 SpO2: 91 % O2 Device: None (Room air)    Weight: 203 lbs 14.81 oz    Seen twice - alone and with wife, caregiver at bedside  Gen: nad, pleasant  HEENT: eomi, mmm, normocephalic  CV: RRR, s1s2 heard  Resp: CTABL  Abdo: s, nt, nd, +bs  Ext: no edema, well perfused, nontender  Neuro: AAox3, no focal deficits, some delayed/labored speech initiation, sensory intact in UE and LE, strength 4+ in legs, 5/5 arms, finger to nose reasonable but with some intention tremor on left, EOMI, CN intact, RUE with some cogwheel rigidity    Data   Recent Labs   Lab 12/21/18  0824 12/20/18  1229 12/20/18  0855 12/19/18  1125   WBC 9.1  --  10.1 8.3   HGB 12.0*  --  12.7* 12.9*   MCV 95  --  95 94   *  --  144* 157   INR  --   --   --  2.05*   *  --   --  141   POTASSIUM 3.3*  --   --  4.0   CHLORIDE 115*  --   --  108   CO2 22  --   --  25   BUN 22  --   --  16   CR 1.17  --   --  1.07   ANIONGAP 8  --   --  8   RENU 7.9*  --   --  8.4*   GLC 93  --   --  106*   ALBUMIN  --   --   --  3.5   PROTTOTAL  --   --   --  6.7*   BILITOTAL  --   --   --  1.2   ALKPHOS  --   --   --  50   ALT  --   --   --  24   AST  --   --   --  21   TROPI  --  0.328* 0.307* 0.042     No results found for this or any previous visit (from the past 24 hour(s)).

## 2018-12-21 NOTE — PLAN OF CARE
Discharge Planner SLP   Patient plan for discharge: Not stated.   Current status: Clinical swallow evaluation completed and treatment initiated.  Patient presents with overt Sx of aspiration with thin and nectar thick liquids, sx of esophageal dysphagia present as well with belching after swallow.  Recommend dysphagia diet level 2 with honey thick liquids by spoon, intermittent feeding assistance and cueing required at this time.   Barriers to return to prior living situation: Weakness  Recommendations for discharge: TCU  Rationale for recommendations: Recommend SLP services at TCU to improve safety of swallowing, assess for diet upgrades, and continue pharyngeal strengthening program.        Entered by: Emily Mackenzie 12/21/2018 2:52 PM

## 2018-12-22 ENCOUNTER — APPOINTMENT (OUTPATIENT)
Dept: PHYSICAL THERAPY | Facility: CLINIC | Age: 83
DRG: 178 | End: 2018-12-22
Attending: HOSPITALIST
Payer: MEDICARE

## 2018-12-22 ENCOUNTER — APPOINTMENT (OUTPATIENT)
Dept: SPEECH THERAPY | Facility: CLINIC | Age: 83
DRG: 178 | End: 2018-12-22
Attending: HOSPITALIST
Payer: MEDICARE

## 2018-12-22 LAB
ANION GAP SERPL CALCULATED.3IONS-SCNC: 9 MMOL/L (ref 3–14)
BUN SERPL-MCNC: 16 MG/DL (ref 7–30)
CALCIUM SERPL-MCNC: 8 MG/DL (ref 8.5–10.1)
CHLORIDE SERPL-SCNC: 113 MMOL/L (ref 94–109)
CO2 SERPL-SCNC: 22 MMOL/L (ref 20–32)
CREAT SERPL-MCNC: 1.06 MG/DL (ref 0.66–1.25)
ERYTHROCYTE [DISTWIDTH] IN BLOOD BY AUTOMATED COUNT: 13.5 % (ref 10–15)
FACT X ACT/NOR PPP CHRO: 20 % (ref 70–130)
GFR SERPL CREATININE-BSD FRML MDRD: 64 ML/MIN/{1.73_M2}
GLUCOSE SERPL-MCNC: 89 MG/DL (ref 70–99)
HCT VFR BLD AUTO: 32.4 % (ref 40–53)
HGB BLD-MCNC: 11.3 G/DL (ref 13.3–17.7)
MCH RBC QN AUTO: 32.8 PG (ref 26.5–33)
MCHC RBC AUTO-ENTMCNC: 34.9 G/DL (ref 31.5–36.5)
MCV RBC AUTO: 94 FL (ref 78–100)
PLATELET # BLD AUTO: 147 10E9/L (ref 150–450)
POTASSIUM SERPL-SCNC: 3.4 MMOL/L (ref 3.4–5.3)
PROCALCITONIN SERPL-MCNC: 0.69 NG/ML
RBC # BLD AUTO: 3.44 10E12/L (ref 4.4–5.9)
SODIUM SERPL-SCNC: 144 MMOL/L (ref 133–144)
WBC # BLD AUTO: 7.5 10E9/L (ref 4–11)

## 2018-12-22 PROCEDURE — 99232 SBSQ HOSP IP/OBS MODERATE 35: CPT | Performed by: INTERNAL MEDICINE

## 2018-12-22 PROCEDURE — A9270 NON-COVERED ITEM OR SERVICE: HCPCS | Mod: GY | Performed by: HOSPITALIST

## 2018-12-22 PROCEDURE — 85027 COMPLETE CBC AUTOMATED: CPT | Performed by: HOSPITALIST

## 2018-12-22 PROCEDURE — 25000128 H RX IP 250 OP 636: Performed by: HOSPITALIST

## 2018-12-22 PROCEDURE — 80048 BASIC METABOLIC PNL TOTAL CA: CPT | Performed by: HOSPITALIST

## 2018-12-22 PROCEDURE — 92526 ORAL FUNCTION THERAPY: CPT | Mod: GN

## 2018-12-22 PROCEDURE — 84145 PROCALCITONIN (PCT): CPT | Performed by: HOSPITALIST

## 2018-12-22 PROCEDURE — A9270 NON-COVERED ITEM OR SERVICE: HCPCS | Mod: GY

## 2018-12-22 PROCEDURE — 12000000 ZZH R&B MED SURG/OB

## 2018-12-22 PROCEDURE — 25000132 ZZH RX MED GY IP 250 OP 250 PS 637: Mod: GY

## 2018-12-22 PROCEDURE — 97161 PT EVAL LOW COMPLEX 20 MIN: CPT | Mod: GP

## 2018-12-22 PROCEDURE — 36415 COLL VENOUS BLD VENIPUNCTURE: CPT | Performed by: HOSPITALIST

## 2018-12-22 PROCEDURE — 85260 CLOT FACTOR X STUART-POWER: CPT | Performed by: HOSPITALIST

## 2018-12-22 PROCEDURE — 25000132 ZZH RX MED GY IP 250 OP 250 PS 637: Mod: GY | Performed by: HOSPITALIST

## 2018-12-22 PROCEDURE — 40000225 ZZH STATISTIC SLP WARD VISIT

## 2018-12-22 PROCEDURE — 40000193 ZZH STATISTIC PT WARD VISIT

## 2018-12-22 RX ORDER — WARFARIN SODIUM 4 MG/1
4 TABLET ORAL
Status: COMPLETED | OUTPATIENT
Start: 2018-12-22 | End: 2018-12-22

## 2018-12-22 RX ADMIN — SENNOSIDES AND DOCUSATE SODIUM 1 TABLET: 8.6; 5 TABLET ORAL at 09:01

## 2018-12-22 RX ADMIN — CARBIDOPA AND LEVODOPA 1 TABLET: 25; 250 TABLET ORAL at 11:43

## 2018-12-22 RX ADMIN — AMOXICILLIN AND CLAVULANATE POTASSIUM 1 TABLET: 875; 125 TABLET, FILM COATED ORAL at 19:07

## 2018-12-22 RX ADMIN — CARBIDOPA AND LEVODOPA 1 TABLET: 25; 250 TABLET ORAL at 19:04

## 2018-12-22 RX ADMIN — AZITHROMYCIN 250 MG: 250 TABLET, FILM COATED ORAL at 09:01

## 2018-12-22 RX ADMIN — AMOXICILLIN AND CLAVULANATE POTASSIUM 1 TABLET: 875; 125 TABLET, FILM COATED ORAL at 09:01

## 2018-12-22 RX ADMIN — CARBIDOPA AND LEVODOPA 1 TABLET: 25; 250 TABLET ORAL at 05:24

## 2018-12-22 RX ADMIN — WARFARIN SODIUM 4 MG: 4 TABLET ORAL at 17:42

## 2018-12-22 RX ADMIN — PIPERACILLIN SODIUM,TAZOBACTAM SODIUM 3.38 G: 3; .375 INJECTION, POWDER, FOR SOLUTION INTRAVENOUS at 02:13

## 2018-12-22 ASSESSMENT — MIFFLIN-ST. JEOR: SCORE: 1622.25

## 2018-12-22 ASSESSMENT — ACTIVITIES OF DAILY LIVING (ADL)
ADLS_ACUITY_SCORE: 19
ADLS_ACUITY_SCORE: 15
ADLS_ACUITY_SCORE: 20
ADLS_ACUITY_SCORE: 19

## 2018-12-22 NOTE — PLAN OF CARE
Discharge Planner SLP   Patient plan for discharge: Not stated this date.   Current status: Swallow Tx completed this date. Pt tolerated po tials of DD2 semi-solids and nectar thick liquids with no overt s/sx of aspiration. SLP re-assessed tolerance of solids. Pt required cues to clear residue from oral cavity, and expressed inability to alleviate sensation of lingual residue given thickened liquid consistency. SLP provided education regarding risk of aspiration, specifically how increased viscosity of liquids allows for improved control of transit through oral and pharyngeal components of the swallow function. Recommend diet advancement to DD2 solids with NECTAR thick liquids by tablespoon. Recommend use of safe swallow precautions including sitting upright, small bites/sips, alternate liquids and solids, ensure clearance from oral cavity. ST will continue to follow with Swallow Tx as indicated.   Barriers to return to prior living situation: Weakness   Recommendations for discharge: TCU  Rationale for recommendations: Recommend SLP services at TCU to improve safety of swallowing, assess for diet upgrades, and continue pharyngeal strengthening program.          Entered by: Brittney Zafar 12/22/2018 12:20 PM

## 2018-12-22 NOTE — PLAN OF CARE
A&OX3 forgetfull. Easily redirectable.Denied pain.On DD2 honey thick diet.Pottasium recheck back as 3.6.Up with 2 ,walker and GB voided adequately incontinent at times.mild cough intermittently.Pending discahrge to TCU

## 2018-12-22 NOTE — PLAN OF CARE
A&O x1-2, disoriented to time and situation, occasionally disoriented to place; forgetful. VSS on RA. Assist of 2, walker GB. LS crackles, infrequent nonproductive cough. Denies pain. BS+, denies nausea, DD2 diet, honey thick liquids. Incontinent at times, otherwise voiding adeq in urinal at bedside.

## 2018-12-22 NOTE — PLAN OF CARE
Discharge Planner PT   Patient plan for discharge: Did not state   Current status: PT eval completed. Pt is admitted with cough and fever and is found to have retrocardiac opacity indicating a likely pneumonia. CT of brain: negative for acute abnormalities. Pt stated he lives with his spouse in an [place] apartment on the ground floor with no steps to manage. Pt was mod I with ambulation using RW and was mod independent with all ADL's. Pt denied having any falls within the past 6 months even though the chart indicated otherwise. Pt owns a RW and 4WW at home. Pt was received supine in bed and needed encouragement to intimate OOB mobility. Pt is at min assist x 1 with supine>sit, sat at EOB x 5 min with SBA for safety, stood x 3 min with CGA and verbal cues for safety and took 5 side steps to the HOB with RW for support and CGA for safety. Pt s Pt has Parkinson's related rigidity in BLE's noted during ROM assessment. Pt refused to initiate ambulation at this time. Pt was assisted back to bed with min assist x 1 and verbal cues. Pt is left in supine with all needs within reach and bed alarm on.  Barriers to return to prior living situation: Fall risk, Level of assistance needed, recent hx of falls.   Recommendations for discharge: TCU  Rationale for recommendations: Pt will benefit from continued skilled PT at a TCU to achieve increased strength, and balance to prevent falls and to regain independence with functional mobility.       Entered by: Carson uJárez 12/22/2018 8:31 AM

## 2018-12-22 NOTE — PLAN OF CARE
Care Plan Summary Note: vss, disoriented to situation. Denied pain. Ax1 with WK/GB. Used urinal standing at bedside. Fair appetite. Diet updated to NDD2 with nectar. Pending discharge to [facility]. Writer spoke with [facility] nurse. Pleasant, calm and cooperative with care.

## 2018-12-22 NOTE — PROGRESS NOTES
[facility]  Progress Note  Date of Service: 12/22/18       Everardo Braswell MD  [facility] Hospitalist  Pager [phone]      Assessment & Plan       Russ Loera is a 84 year old male admitted on 12/19/2018 for cough and fever who was found to have retrocardiac opacity indicating a likely pneumonia. He was lethargic later on night of admission and CT head was actually repeated due to concern of change in mental status in conjunction with anticoagulated status and recent fall - no acute changes noted. Since then he has been stable and improved overall. As of 12/21, he has remained afebrile since night of 19th and is saturating well on RA.  On 12/21 his wife and home caregiver came in to see him and we discussed updates in detail.  It seems as though he is neurologically returned to baseline aside from generalized weakness.     Community acquired pneumonia, suspicion of aspiration pneumonia   - On adm: Left retrocardiac opacity, fever, cough; WBC normal range, Influenza A/B antigen negative  - Afebrile last ~48 hours, last fever was 12/20 spiked temp 102.6, APAP given.  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    - Continue to address/discuss pending PT eval  - Don voiced that he may want to wait and talk with PCP on the issue.    Acute demand ischemia with mild, flat troponin elevation and T wave inversions  - Cardiology consult appreciated - echo ordered, lexiscan in several weeks  - no further eval felt necessary per Cards  - Echo 12/22: EF 55-60%, no WMAs, mild-mod MR    Recent falls  - Physical therapy consulted - no major cardiology testing planned, please evaluate and treat 12/21  - CT head completed on adm and repeated later that night 12/19 - chronic small vessel ischemic disease changes, no acute intracranial pathology     History of BPH  - Robbins placed in ED for inability to void  - Removed robbins, monitor for retention, scan and straight cath as needed  - not on pta meds, but care everywhere indicates previously on flomax - consider resuming if needed     History of Parkinsons Disease  - Continue PTA carbidopa-levodopa 25-250mg tid  - seems at baseline - per family on phone and in person, Don is near baseline with speech and movement in bed (gait not yet assessed - reportedly uses walker/cane at home)    Diet: await speech therapy  DVT Prophylaxis: Warfarin  Robbins Catheter: in place, indication:  BPH  Code Status: Full code        Disposition Plan: Expected discharge in 1 day to TCU       Everardo Braswell MD  Hospitalist Service  [facility]    ______________________________________________________________________    Interval History   Continues to improve, at baseline mental status today, still reports significant weakness but no other concerns.      Data reviewed today: I reviewed all medications, new labs and imaging results over the last 24 hours. I personally reviewed no images or EKG's today.    Physical Exam   Vital Signs: Temp: 99.5  F  (37.5  C) Temp src: Oral BP: 133/71 Pulse: 64 Heart Rate: 64 Resp: 16 SpO2: 93 % O2 Device: None (Room air)    Weight: 204 lbs 2.34 oz    Gen: A+Ox4, NAD  CV: RRR, s1s2 heard, no murmur  Resp: CTAB, normal WOB  Abdo: s, nt, nd, +bs  Ext: no edema, well perfused, nontender  Neuro: No focal deficits, some delayed/labored speech initiation, sensory intact in UE and LE, strength 4+ in legs, 5/5 arms, finger to nose reasonable but with some intention tremor on left, EOMI, CN intact, RUE with some cogwheel rigidity    Data   Recent Labs   Lab 12/22/18  0903 12/21/18  1931 12/21/18  0824 12/20/18  1229 12/20/18  0855 12/19/18  1125   WBC 7.5  --  9.1  --  10.1 8.3   HGB 11.3*  --  12.0*  --  12.7* 12.9*   MCV 94  --  95  --  95 94   *  --  139*  --  144* 157   INR  --   --   --   --   --  2.05*     --  145*  --   --  141   POTASSIUM 3.4 3.6 3.3*  --   --  4.0   CHLORIDE 113*  --  115*  --   --  108   CO2 22  --  22  --   --  25   BUN 16  --  22  --   --  16   CR 1.06  --  1.17  --   --  1.07   ANIONGAP 9  --  8  --   --  8   RENU 8.0*  --  7.9*  --   --  8.4*   GLC 89  --  93  --   --  106*   ALBUMIN  --   --   --   --   --  3.5   PROTTOTAL  --   --   --   --   --  6.7*   BILITOTAL  --   --   --   --   --  1.2   ALKPHOS  --   --   --   --   --  50   ALT  --   --   --   --   --  24   AST  --   --   --   --   --  21   TROPI  --   --   --  0.328* 0.307* 0.042     No results found for this or any previous visit (from the past 24 hour(s)).

## 2018-12-22 NOTE — PROGRESS NOTES
12/22/18 0800   Quick Adds   Type of Visit Initial PT Evaluation   Living Environment   Lives With facility resident   Living Arrangements assisted living   Home Accessibility no concerns   Living Environment Comment Pt lives with spouse in an assisted living facility on the ground floor with no stairs to manage.    Self-Care   Usual Activity Tolerance moderate   Current Activity Tolerance fair   Equipment Currently Used at Home walker, rolling   Activity/Exercise/Self-Care Comment Pt was mod I with all ADL's at home.   Functional Level Prior   Ambulation 1-->assistive equipment   Transferring 1-->assistive equipment   Toileting 0-->independent   Bathing 0-->independent   Communication 0-->understands/communicates without difficulty   Swallowing 0-->swallows foods/liquids without difficulty   Cognition 0 - no cognition issues reported   Fall history within last six months no   Which of the above functional risks had a recent onset or change? ambulation;transferring   Prior Functional Level Comment Pt was mod I with ambulation using RW. Pt owns a RW and 4WW.   General Information   Onset of Illness/Injury or Date of Surgery - Date 12/19/18   Referring Physician Brian Conde MD   Patient/Family Goals Statement get stronger   Pertinent History of Current Problem (include personal factors and/or comorbidities that impact the POC) Pt with hx of BPH, Parkinsons, and PE is admitted with cough and fever and is found to have retrocardiac opacity indicating a likely pneumonia.   Precautions/Limitations fall precautions   Weight-Bearing Status - LLE full weight-bearing   Weight-Bearing Status - RLE full weight-bearing   General Observations Cooperative, Hard of hearing   Cognitive Status Examination   Orientation person;place   Level of Consciousness alert   Follows Commands and Answers Questions 100% of the time   Personal Safety and Judgment intact   Memory impaired   Pain Assessment   Patient Currently in Pain No    Range of Motion (ROM)   ROM Comment BLE: WFL, pt noted with rigidity in BLE's.    Strength   Strength Comments Strength: grossly 4/5 bilaterally   Bed Mobility   Bed Mobility Comments Supine>sit: min asssit x 1, Sit>supine: min assist x 1    Transfer Skills   Transfer Comments STS with CGA and verbal cues. Pt stood x 3 min with RW for support and CGA for safety.    Gait   Gait Comments Pt took 5 side steps to the HOB with RW for support and CGA for safety.   Balance   Balance Comments Sitting: good   Sensory Examination   Sensory Perception no deficits were identified   Coordination   Coordination Comments Not assessed    Muscle Tone   Muscle Tone Comments Impaired, Rigidity noted    General Therapy Interventions   Planned Therapy Interventions balance training;bed mobility training;gait training;neuromuscular re-education;ROM;strengthening;stretching;transfer training;risk factor education;home program guidelines;progressive activity/exercise   Clinical Impression   Criteria for Skilled Therapeutic Intervention yes, treatment indicated   PT Diagnosis Impaired gait   Influenced by the following impairments Impaired balance, decreased strength, decreased activity tolerance   Functional limitations due to impairments assist needed with bed mobility, transfers and gait   Clinical Presentation Stable/Uncomplicated   Clinical Presentation Rationale Pt admitted with cough, fever and possbile PNA.    Clinical Decision Making (Complexity) Low complexity   Therapy Frequency` daily   Predicted Duration of Therapy Intervention (days/wks) 5   Anticipated Discharge Disposition Transitional Care Facility   Risk & Benefits of therapy have been explained Yes   Patient, Family & other staff in agreement with plan of care Yes   Clinical Impression Comments Pt presents with impaired balance,decreased strength and activity tolerance limiting fucntional mobility and safety. Pt will benefit from continued skilled PT to achieve  "independence with fucntional mobility and balance.    [facility] AM-PAC  \"6 Clicks\" V.2 Basic Mobility Inpatient Short Form   1. Turning from your back to your side while in a flat bed without using bedrails? 3 - A Little   2. Moving from lying on your back to sitting on the side of a flat bed without using bedrails? 3 - A Little   3. Moving to and from a bed to a chair (including a wheelchair)? 3 - A Little   4. Standing up from a chair using your arms (e.g., wheelchair, or bedside chair)? 3 - A Little   5. To walk in hospital room? 3 - A Little   6. Climbing 3-5 steps with a railing? 2 - A Lot   Basic Mobility Raw Score (Score out of 24.Lower scores equate to lower levels of function) 17   Total Evaluation Time   Total Evaluation Time (Minutes) 20     "

## 2018-12-22 NOTE — PROGRESS NOTES
SW:  D:  Dr Braswell anticipates discharge on Sunday.  TCU is recommended.  Referral had been made to [facility] earlier this week.  Writer contacted [facility] in admissions and she assessed and accepted patient into a double room at [facility] for Sunday.  Writer attempted to speak with patient however he was sleeping.  Writer contacted son Estrada as patient's spouse is unable to speak.  Updated Estrada on the recommendation and he is in total agreement with TCU recommendation and wishes to accept the double room at [facility].    He would like to transport his father by car.  A:  Both son and bedside nurse report patient is disoriented so he may not understand the plan.  Will try to review with patient on Sunday.    P:  The room at [facility] is available after 1700 on Sunday.  Once Dr Braswell writes discharge orders, writer will organzie the discharge with son and the nursing unit at [facility] by calling [phone] and faxing orders to [phone].

## 2018-12-23 ENCOUNTER — APPOINTMENT (OUTPATIENT)
Dept: SPEECH THERAPY | Facility: CLINIC | Age: 83
DRG: 178 | End: 2018-12-23
Payer: MEDICARE

## 2018-12-23 VITALS
BODY MASS INDEX: 28.47 KG/M2 | TEMPERATURE: 98.2 F | RESPIRATION RATE: 16 BRPM | HEART RATE: 64 BPM | OXYGEN SATURATION: 93 % | HEIGHT: 70 IN | SYSTOLIC BLOOD PRESSURE: 149 MMHG | WEIGHT: 198.85 LBS | DIASTOLIC BLOOD PRESSURE: 82 MMHG

## 2018-12-23 LAB — INTERPRETATION ECG - MUSE: NORMAL

## 2018-12-23 PROCEDURE — A9270 NON-COVERED ITEM OR SERVICE: HCPCS | Mod: GY | Performed by: HOSPITALIST

## 2018-12-23 PROCEDURE — 99238 HOSP IP/OBS DSCHRG MGMT 30/<: CPT | Performed by: INTERNAL MEDICINE

## 2018-12-23 PROCEDURE — 92526 ORAL FUNCTION THERAPY: CPT | Mod: GN

## 2018-12-23 PROCEDURE — 85260 CLOT FACTOR X STUART-POWER: CPT | Performed by: HOSPITALIST

## 2018-12-23 PROCEDURE — 40000225 ZZH STATISTIC SLP WARD VISIT

## 2018-12-23 PROCEDURE — 25000132 ZZH RX MED GY IP 250 OP 250 PS 637: Mod: GY | Performed by: HOSPITALIST

## 2018-12-23 PROCEDURE — 36415 COLL VENOUS BLD VENIPUNCTURE: CPT | Performed by: HOSPITALIST

## 2018-12-23 RX ORDER — TRAMADOL HYDROCHLORIDE 50 MG/1
50 TABLET ORAL EVERY 6 HOURS PRN
Qty: 20 TABLET | Refills: 0 | Status: SHIPPED | OUTPATIENT
Start: 2018-12-23 | End: 2019-01-22

## 2018-12-23 RX ORDER — WARFARIN SODIUM 4 MG/1
4 TABLET ORAL
Status: DISCONTINUED | OUTPATIENT
Start: 2018-12-23 | End: 2018-12-23 | Stop reason: HOSPADM

## 2018-12-23 RX ADMIN — CARBIDOPA AND LEVODOPA 1 TABLET: 25; 250 TABLET ORAL at 11:17

## 2018-12-23 RX ADMIN — CARBIDOPA AND LEVODOPA 1 TABLET: 25; 250 TABLET ORAL at 16:55

## 2018-12-23 RX ADMIN — AMOXICILLIN AND CLAVULANATE POTASSIUM 1 TABLET: 875; 125 TABLET, FILM COATED ORAL at 08:28

## 2018-12-23 RX ADMIN — CARBIDOPA AND LEVODOPA 1 TABLET: 25; 250 TABLET ORAL at 05:42

## 2018-12-23 RX ADMIN — AZITHROMYCIN 250 MG: 250 TABLET, FILM COATED ORAL at 08:28

## 2018-12-23 RX ADMIN — SENNOSIDES AND DOCUSATE SODIUM 1 TABLET: 8.6; 5 TABLET ORAL at 08:28

## 2018-12-23 ASSESSMENT — ACTIVITIES OF DAILY LIVING (ADL)
ADLS_ACUITY_SCORE: 20

## 2018-12-23 ASSESSMENT — MIFFLIN-ST. JEOR: SCORE: 1598.25

## 2018-12-23 NOTE — PROGRESS NOTES
SW  Discharge orders have been entered.  Orders and script faxed to [facility] [phone] and confirmed with TCU staff that they can accept patient at 1700.  Son Estrada will transport.  He knows he needs to bring clothes for transport and he will arrive here between 1600 and 1630.  He asked how his father was doing.  Reviewed RN progress note from  evening.  Also offered to ask Dr Braswell to call him with an update however Estrada reports he will be occupied with a  and [employer] services so declined a call.

## 2018-12-23 NOTE — PROGRESS NOTES
Discharge    Patient discharged to [facility]  via private transportation with son  Care plan note: Pt is A&O x 3, disoriented to times. Pt has been discharged to [facility]. Transportation provided by son. Pt left from station 66 at 1700.     Listed belongings gathered and returned to patient. Yes  Care Plan and Patient education resolved: Yes  Prescriptions if needed, hard copies sent with patient  Yes  Home and hospital acquired medications returned to patient: Yes  Medication Bin checked and emptied on discharge Yes  Follow up appointment made for patient: JOHNSON

## 2018-12-23 NOTE — DISCHARGE SUMMARY
[facility]  Hospitalist Discharge Summary       Date of Admission:  12/19/2018  Date of Discharge:  12/23/2018  Discharging Provider: Everardo Braswell MD (Pager [phone])        Discharge Diagnoses   Aspiration Pneumonia     Follow-ups Needed After Discharge   TCU MD in 1-2 days  PCP in 7 days from discharge from TCU  Unresulted Labs Ordered in the Past 30 Days of this Admission     Date and Time Order Name Status Description    12/23/2018 0000 Factor 10 chromogenic In process     12/19/2018 1315 Blood culture Preliminary     12/19/2018 1315 Blood culture Preliminary       No growth from blood cultures      Hospital Course   Russ Loera is a 84 year old male admitted on 12/19/2018 for cough and fever who was found to have retrocardiac opacity indicating a likely pneumonia. He was lethargic later on night of admission and CT head was actually repeated due to concern of change in mental status in conjunction with anticoagulated status and recent fall - no acute changes noted. Since then he has been stable and improved overall. As of 12/21, he has remained afebrile since night of 19th and is saturating well on RA.  On 12/21 his wife and home caregiver came in to see him and we discussed updates in detail.  It seems as though he is neurologically returned to baseline aside from generalized weakness.     Community acquired pneumonia, suspicion of aspiration pneumonia   - On adm: Left retrocardiac opacity, fever, cough; WBC normal range, Influenza A/B antigen negative  - Afebrile last ~48 hours, last fever was 12/20 spiked temp 102.6, APAP given.  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    - Continue to address/discuss pending PT eval  - Don voiced that he may want to wait and talk with PCP on the issue, so will continue on discharge to TCU since he will be supervised while there.    Acute demand ischemia with mild, flat troponin elevation and T wave inversions  - Cardiology consult appreciated - echo ordered, lexiscan in several weeks  - no further eval felt necessary per Cards  - Echo 12/22: EF 55-60%, no WMAs, mild-mod MR    Recent falls  - Physical therapy consulted - no major cardiology testing planned, please evaluate and treat 12/21  - CT head completed on adm and repeated later that night 12/19 - chronic small vessel ischemic disease changes, no acute intracranial pathology     History of BPH  - Robbins placed in ED for inability to void  - Removed robbins, monitor for retention, scan and straight cath as needed  - not on pta meds, but care everywhere indicates previously on flomax - consider resuming if needed     History of Parkinsons Disease  - Continue PTA carbidopa-levodopa 25-250mg tid  - seems at baseline - per family on phone and in person, Don is near baseline with speech and movement in bed (gait not yet assessed - reportedly uses walker/cane at home)    Diet: Dysphagia Diet 2  DVT Prophylaxis: Warfarin  Code Status: Full  code         ______________________________________________________________________      Consultations This Hospital Stay   PHARMACY TO DOSE WARFARIN  PHYSICAL THERAPY ADULT IP CONSULT  PHARMACY TO DOSE WARFARIN  CARDIOLOGY IP CONSULT  SPEECH LANGUAGE PATH ADULT IP CONSULT  PHYSICAL THERAPY ADULT IP CONSULT  OCCUPATIONAL THERAPY ADULT IP CONSULT  SPEECH LANGUAGE PATH ADULT IP CONSULT    Code Status   Full Code    Time Spent on this Encounter   I, Everardo Braswell, personally saw the patient today and spent less than or equal to 30 minutes discharging this patient.       Everardo Braswell MD  [facility]  ______________________________________________________________________    Physical Exam   Vital Signs: Temp: 98.2  F (36.8  C) Temp src: Oral BP: 149/82 Pulse: 64   Resp: 16 SpO2: 93 % O2 Device: None (Room air)    Weight: 198 lbs 13.68 oz  Constitutional: awake, alert, cooperative, no apparent distress, and appears stated age  Respiratory: No increased work of breathing, good air exchange, clear to auscultation bilaterally, no crackles or wheezing  Cardiovascular: Normal apical impulse, regular rate and rhythm, normal S1 and S2, no S3 or S4, and no murmur noted  GI: No scars, normal bowel sounds, soft, non-distended, non-tender, no masses palpated, no hepatosplenomegally  Musculoskeletal: There is no redness, warmth, or swelling of the joints.  Full range of motion noted.  Motor strength is 5 out of 5 all extremities bilaterally.  Tone is normal.  Neurologic: Awake, alert, oriented to name, place and time.  Cranial nerves II-XII are grossly intact.  Motor is 5 out of 5 bilaterally.  Cerebellar finger to nose, heel to shin intact.  Sensory is intact.  Babinski down going, Romberg negative, and gait is normal.       Primary Care Physician   Trung Galvez    Discharge Disposition   Discharged to short-term care facility  Condition at discharge: Good    Significant Results and Procedures    Results for orders placed or performed during the hospital encounter of 12/19/18   Chest XR,  PA & LAT    Narrative    CHEST TWO VIEWS December 19, 2018 12:36 PM     HISTORY: 84-year-old patient with cough and weakness.       Impression    IMPRESSION: Since CT exam on January 29, 2010, heart size is normal.  Patchy left retrocardiac opacity is noted. This could relate to  atelectasis, though pneumonia is a radiographically possibility.  Consider surveillance until resolution. No pleural effusion or  pneumothorax. 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    BOB DALLAS MD   Head CT w/o contrast    Narrative    CT OF THE HEAD WITHOUT CONTRAST December 19, 2018 1:19 PM     HISTORY: Head trauma, minor, GCS>=13, high clinical risk, initial  exam.    TECHNIQUE: 5 mm thick axial CT images of the head were acquired  without IV contrast material. Radiation dose for this scan was reduced  using automated exposure control, adjustment of the mA and/or kV  according to patient size, or iterative reconstruction technique.    COMPARISON: Head CT 7/13/2016.    FINDINGS: There is mild diffuse cerebral volume loss. There are subtle  patchy areas of decreased density in the cerebral white matter  bilaterally that are consistent with sequela of chronic small vessel  ischemic disease.    The ventricles and basal cisterns are within normal limits in  configuration given the degree of cerebral volume loss. There is no  midline shift. There are no extra-axial fluid collections.     No intracranial hemorrhage, mass or recent infarct.    The visualized paranasal sinuses are well-aerated. There is no  mastoiditis. There are no fractures of the visualized bones.       Impression    IMPRESSION: Diffuse cerebral volume loss and cerebral white matter  changes consistent with chronic small vessel ischemic disease. No  evidence for acute intracranial pathology.            ELANA LANE MD   CT Head w/o Contrast    Narrative     CT SCAN OF THE HEAD WITHOUT CONTRAST   12/19/2018 9:04 PM     HISTORY: Altered level of consciousness (LOC), unexplained. On  coumadin, fall, possible change in mentation, rule out bleed.    TECHNIQUE:  Axial images of the head and coronal reformations without  IV contrast material. Radiation dose for this scan was reduced using  automated exposure control, adjustment of the mA and/or kV according  to patient size, or iterative reconstruction technique.    COMPARISON: 12/9/2018    FINDINGS:  Moderate volume loss is present. Patchy white matter hypoattenuation  likely represents chronic small vessel ischemic change. No evidence of  acute ischemia, hemorrhage, mass, mass effect, or hydrocephalus.  Scattered vascular calcifications are present. Skull base evaluation  is limited due to motion artifact without appreciable abnormality.      Impression    IMPRESSION:   No acute intracranial abnormality.    ADALGISA ECHEVERRIA MD       Discharge Orders      NM Lexiscan stress test (nuc card)     Follow-Up with Cardiac Advanced Practice Provider      General info for SNF    Length of Stay Estimate: Short Term Care: Estimated # of Days <30  Condition at Discharge: Improving  Level of care:skilled   Rehabilitation Potential: Good  Admission H&P remains valid and up-to-date: Yes  Recent Chemotherapy: N/A  Use Nursing Home Standing Orders: Yes     Activity - Up with nursing assistance     Full Code     Physical Therapy Adult Consult    Evaluate and treat as clinically indicated.    Reason:  Weakness and improvement in balance     Occupational Therapy Adult Consult    Evaluate and treat as clinically indicated.    Reason:  Weakness and improvement in balance and functional independence     Speech Language Path Adult Consult    Evaluate and treat as clinically indicated.    Reason:  On dysphagia diet, SLP inpatient recommends continued f/u at TCU     Advance Diet as Tolerated    Follow this diet upon discharge: Orders Placed This  Encounter      Combination Diet Dysphagia Diet Level 2: Mechan Altered; Nectar Thickened Liquids (pre-thickened or use instant food thickener)     Discharge Medications   Current Discharge Medication List      START taking these medications    Details   amoxicillin-clavulanate (AUGMENTIN) 875-125 MG tablet Take 1 tablet by mouth every 12 hours for 3 days  Qty: 6 tablet, Refills: 0    Associated Diagnoses: Aspiration pneumonia, unspecified aspiration pneumonia type, unspecified laterality, unspecified part of lung (H)         CONTINUE these medications which have CHANGED    Details   traMADol (ULTRAM) 50 MG tablet Take 1 tablet (50 mg) by mouth every 6 hours as needed for moderate pain  Qty: 20 tablet, Refills: 0    Associated Diagnoses: Chronic low back pain, unspecified back pain laterality, with sciatica presence unspecified         CONTINUE these medications which have NOT CHANGED    Details   acetaminophen (TYLENOL) 500 MG tablet Take 500 mg by mouth 4 times daily       carbidopa-levodopa (SINEMET)  MG per tablet Take 1 tablet by mouth 3 times daily 0400; 1100; 1700      warfarin (COUMADIN) 5 MG tablet Take 5 mg by mouth daily           Allergies   Allergies   Allergen Reactions     Oxycodone Anaphylaxis     Fentanyl Nausea     Propofol Nausea and Vomiting

## 2018-12-23 NOTE — PLAN OF CARE
Discharge Planner SLP   Patient plan for discharge: Pt discharging to transitional care facility later this date.   Current status: Swallow Tx completed this date. Pt tolerated po trials (6 oz) of thin liquid by cup with no overt s/sx of aspiration. Pt required max cues to take small sips and control rate of intake. Recommend continuation of Dysphagia Diet Level 2 semi-solids with advancement to THIN liquids by cup. NO STRAWS. Recommend use of safe swallow precautions including sitting upright, small bites/sips, alternate bites/sips, NO STRAWS. Recommend continuation of skilled ST intervention at next level of care.   Barriers to return to prior living situation: weakness  Recommendations for discharge: TCU  Rationale for recommendations: Skilled ST intervention indicated at next level of care to ensure tolerance of po diet and provide further education about safe swallow precautions.        Entered by: Brittney Zafar 12/23/2018 4:19 PM

## 2018-12-23 NOTE — PLAN OF CARE
Care Plan Summary Note: vss, disorient to time. Forgetful. Denied pain. +BS, lungs diminished, on RA. Ax1 with GB/WK to bathroom. Good appetite. [place]. Will discharge to [facility] with son around 4pm today. Son is to bring clothing.

## 2018-12-23 NOTE — PLAN OF CARE
Physical Therapy Discharge Summary    Reason for therapy discharge:    Discharged to transitional care facility.    Progress towards therapy goal(s). See goals on Care Plan in [facility] electronic health record for goal details.  Goals not met.  Barriers to achieving goals:   discharge from facility.    Therapy recommendation(s):    Continued therapy is recommended.  Rationale/Recommendations:  cont PT at TCU to further maximize strength, independence, and functional mobility.

## 2018-12-23 NOTE — PLAN OF CARE
[id number]: Hard of hearing.  A&Ox4, forgetful at times.  SBA, repositions self independently in bed.  Up in chair for most of the evening.  Tolerates DD2 with nectar thick liquid, though does not like the texture.  Fluid intake good.  Saline locked.  VSS on RA, afebrile.  Denies pain.  LS with crackles in the bases.  IS education provided and patient performed well.  Has urinary urgency and dribbles at times.  Was incontinent of a loose stool in the evening, no further episodes.  PT and speech consults.  Son to transport to [facility] TCU after 5pm today.

## 2018-12-23 NOTE — PLAN OF CARE
Speech Language Therapy Discharge Summary    Reason for therapy discharge:    Discharged to transitional care facility.    Progress towards therapy goal(s). See goals on Care Plan in [facility] electronic health record for goal details.  Goals partially met.  Barriers to achieving goals:   discharge from facility.    Therapy recommendation(s):    Continued therapy is recommended.  Rationale/Recommendations:   .    Swallow Tx completed this date. Pt tolerated po trials (6 oz) of thin liquid by cup with no overt s/sx of aspiration. Pt required max cues to take small sips and control rate of intake. Recommend continuation of Dysphagia Diet Level 2 semi-solids with advancement to THIN liquids by cup. NO STRAWS. Recommend use of safe swallow precautions including sitting upright, small bites/sips, alternate bites/sips, NO STRAWS. Recommend continuation of skilled ST intervention at next level of care.

## 2018-12-24 LAB — FACT X ACT/NOR PPP CHRO: 22 % (ref 70–130)

## 2018-12-25 LAB
BACTERIA SPEC CULT: NO GROWTH
BACTERIA SPEC CULT: NO GROWTH
Lab: NORMAL
Lab: NORMAL
SPECIMEN SOURCE: NORMAL
SPECIMEN SOURCE: NORMAL

## 2018-12-26 ENCOUNTER — DOCUMENTATION ONLY (OUTPATIENT)
Dept: CARDIOLOGY | Facility: CLINIC | Age: 83
End: 2018-12-26

## 2018-12-26 ENCOUNTER — TELEPHONE (OUTPATIENT)
Dept: CARDIOLOGY | Facility: CLINIC | Age: 83
End: 2018-12-26

## 2018-12-26 DIAGNOSIS — R94.31 ABNORMAL ELECTROCARDIOGRAM: Primary | ICD-10-CM

## 2018-12-26 NOTE — TELEPHONE ENCOUNTER
Patient was evaluated by cardiology while inpatient for pneumonia and mild troponin leak, without chest pain. Dr. Pate consulted and ordered f/u OP [facility] OV with Lexiscan. RN called [facility] TCU to confirm the follow up plan for patient with Care Coordinator. RN left message for Care Coordinator that patient needs to be scheduled for f/u as above and scheduling phone number provided. Reminded to send last progress note, MAR, active orders, and provider order sheet to appt. SONY Flowers RN.

## 2018-12-26 NOTE — PROGRESS NOTES
Message from [place] team (Brionna [phone]) that nuclear study needs a different diagnosis to qualify for payment. Re-ordered lexiscan study.

## 2019-01-11 LAB
ANION GAP SERPL CALCULATED.3IONS-SCNC: 9 MMOL/L
BUN SERPL-MCNC: 17 MG/DL
CALCIUM SERPL-MCNC: 9.5 MG/DL
CHLORIDE SERPLBLD-SCNC: 106 MMOL/L
CO2 SERPL-SCNC: 24 MMOL/L
CREAT SERPL-MCNC: 1.01 MG/DL
ERYTHROCYTE [DISTWIDTH] IN BLOOD BY AUTOMATED COUNT: ABNORMAL %
GFR SERPL CREATININE-BSD FRML MDRD: >60 ML/MIN/1.73M2
GLUCOSE SERPL-MCNC: 92 MG/DL (ref 65–100)
HCT VFR BLD AUTO: ABNORMAL %
HEMOGLOBIN: 12.8 G/DL (ref 13.5–17.5)
MCH RBC QN AUTO: ABNORMAL PG
MCHC RBC AUTO-ENTMCNC: ABNORMAL G/DL
MCV RBC AUTO: 98 FL
PLATELET # BLD AUTO: ABNORMAL 10^9/L
POTASSIUM SERPL-SCNC: 4.3 MMOL/L
RBC # BLD AUTO: ABNORMAL 10^12/L
SODIUM SERPL-SCNC: 139 MMOL/L
WBC # BLD AUTO: ABNORMAL 10^9/L

## 2019-01-16 ENCOUNTER — PRE VISIT (OUTPATIENT)
Dept: CARDIOLOGY | Facility: CLINIC | Age: 84
End: 2019-01-16

## 2019-01-16 NOTE — TELEPHONE ENCOUNTER
Faxed records request to [facility] for records update    1/18/19 received reply from [facility] - DEVAN is needed. Spoke with patient and his aide Shanell and they will call Dr. Verdin's office to arrange this.

## 2019-01-23 ENCOUNTER — HOSPITAL ENCOUNTER (OUTPATIENT)
Dept: GENERAL RADIOLOGY | Facility: CLINIC | Age: 84
Discharge: HOME | End: 2019-01-23
Payer: MEDICARE

## 2019-01-23 ENCOUNTER — HOSPITAL ENCOUNTER (OUTPATIENT)
Dept: SPEECH THERAPY | Facility: CLINIC | Age: 84
Setting detail: THERAPIES SERIES
End: 2019-01-23
Attending: INTERNAL MEDICINE
Payer: MEDICARE

## 2019-01-23 DIAGNOSIS — R13.10 DYSPHAGIA, UNSPECIFIED TYPE: ICD-10-CM

## 2019-01-23 PROCEDURE — 92610 EVALUATE SWALLOWING FUNCTION: CPT | Mod: GN | Performed by: SPEECH-LANGUAGE PATHOLOGIST

## 2019-01-23 PROCEDURE — 74230 X-RAY XM SWLNG FUNCJ C+: CPT

## 2019-01-23 PROCEDURE — 92526 ORAL FUNCTION THERAPY: CPT | Mod: GN | Performed by: SPEECH-LANGUAGE PATHOLOGIST

## 2019-01-23 PROCEDURE — 92611 MOTION FLUOROSCOPY/SWALLOW: CPT | Mod: GN | Performed by: SPEECH-LANGUAGE PATHOLOGIST

## 2019-01-23 RX ORDER — BARIUM SULFATE 400 MG/ML
SUSPENSION ORAL ONCE
Status: COMPLETED | OUTPATIENT
Start: 2019-01-23 | End: 2019-01-23

## 2019-01-23 RX ADMIN — BARIUM SULFATE 20 ML: 400 SUSPENSION ORAL at 11:45

## 2019-01-23 NOTE — PROGRESS NOTES
[facility]          OUTPATIENT SWALLOW  EVALUATION  PLAN OF TREATMENT FOR OUTPATIENT REHABILITATION  (COMPLETE FOR INITIAL CLAIMS ONLY)  Patient's Last Name, First Name, M.I.  YOB: 1934  Russ Loera     Provider's Name   [facility]   Medical Record No.  [id number]     Start of Care Date:  01/23/19   Onset Date:  01/02/19   Type:     ___PT   ____OT  ___X_SLP Medical Diagnosis:        Treatment Diagnosis:  Mild oropharyngeal dysphagia Visits from SOC:  1     _________________________________________________________________________________  Plan of Treatment/Functional Goals:                           Goals   1. Goal Identifier: Strategies.       Goal Description: 1. Patient will improve safety of oral intake by verbalizing comprehension of 4/4 written and verbal strategies.       Target Date: 01/23/19       Date Met: 01/23/19   2.                             3.                             4.                             5.                            6.                              7.                              8.                              Therapy Frequency: other (see comments)  Predicted Duration of Therapy Intervention (days/wks): 1x follow up today    Emily Mackenzie, SLP       I CERTIFY THE NEED FOR THESE SERVICES FURNISHED UNDER        THIS PLAN OF TREATMENT AND WHILE UNDER MY CARE .             Physician Signature               Date    X_____________________________________________________                                     Referring Physician: Dr. Randy Pedroza    Initial Assessment        See Epic Evaluation Start Of Care Date: 01/23/19

## 2019-01-23 NOTE — PROGRESS NOTES
"CLINICAL AND VIDEO SWALLOW STUDY     01/23/19 1100   General Information   Type Of Visit Initial   Start Of Care Date 01/23/19   Referring Physician Dr. Randy Pedroza   Orders Evaluate And Treat   Orders Comment Video swallow study   Medical Diagnosis Dysphagia   Onset Of Illness/injury Or Date Of Surgery 01/02/19   Hearing Impaired, unaided.   Pertinent History of Current Problem/OT: Additional Occupational Profile Info Patient's PMH is significant for Parkinsonian symptoms, GERD, and HTN.  Patient was hospitalized in December 2018 for aspiration pneumonia.  He reports thickened liquids were recommended at TCU.  He is \"home\" at his Assisted Living now and independently adding thickener to his beverages.  He reports most trouble still occurs when eating cold cereal with milk on it.    Respiratory Status Room air   Prior Level Of Function Swallowing   Prior Level Of Function Comment Regular diet, nectar thick liquids?   Patient Role/employment History Retired   Living Environment Assisted Living   Home/community Accessibility Comments (flowsheet Row) Assists in his wife's care (on hospice)   General Observations Patient is pleasant, cooperative.  He is a poor historian.    Patient/family Goals Patient would like to avoid coughing while eating.    Clinical Swallow Evaluation   Oral Musculature generally intact   Structural Abnormalities none present   Dentition present and adequate   Mucosal Quality good   Mandibular Strength and Mobility intact   Oral Labial Strength and Mobility WFL   Lingual Strength and Mobility WFL   Velar Elevation intact   Buccal Strength and Mobility intact   Laryngeal Function Cough;Throat clear;Swallow;Voicing initiated;Dry swallow palpated   Oral Musculature Comments WFL   Additional Documentation Yes   Additional evaluation(s) completed today Yes;Recommended   Rationale for completing additional evaluation Video swallow study indicated to assess oropharyngeal swallowing function and " aspiration risk.    Clinical Swallow Eval: Thin Liquid Texture Trial   Mode of Presentation, Thin Liquids cup;self-fed   Volume of Liquid or Food Presented 2 oz   Oral Phase of Swallow WFL   Pharyngeal Phase of Swallow intact   Diagnostic Statement No overt Sx of aspiration.    VFSS Evaluation   VFSS Additional Documentation Yes   VFSS Eval: Thin Liquid Texture Trial   Mode of Presentation, Thin Liquid cup;self-fed   Preparatory Phase WFL   Oral Phase, Thin Liquid Premature pharyngeal entry   Pharyngeal Phase, Thin Liquid Delayed swallow reflex;Residue in valleculae;Residue in pyriform sinus  (Minimal)   Rosenbek's Penetration Aspiration Scale: Thin Liquid Trial Results 2 - contrast enters airway, remains above the vocal cords, no residue remains (penetration)   Diagnostic Statement Flash penetration of residue from pyriform sinuses, not reaching vocal fold level.    VFSS Eval: Nectar Thick Liquid Texture Trial   Mode of Presentation, Nectar cup;self-fed   Preparatory Phase WFL   Oral Phase, Nectar Premature pharyngeal entry   Pharyngeal Phase, Nectar Delayed swallow reflex;Residue in valleculae;Residue in pyriform sinus  (Minimal)   Rosenbek's Penetration Aspiration Scale: Nectar-Thick Liquid Trial Results 1 - no aspiration, contrast does not enter airway   Diagnostic Statement Minimal pharyngeal residue remaining after swallow   VFSS Eval: Puree Solid Texture Trial   Mode of Presentation, Puree spoon;self-fed   Preparatory Phase WFL   Oral Phase, Puree WFL   Pharyngeal Phase, Puree Residue in valleculae  (Mild)   Rosenbek's Penetration Aspiration Scale: Puree Food Trial Results 1 - no aspiration, contrast does not enter airway   Diagnostic Statement Mild valleculae residue    VFSS Eval: Solid Food Texture Trial   Mode of Presentation, Solid self-fed   Oral Phase, Solid WFL   Pharyngeal Phase, Solid WFL   Rosenbek's Penetration Aspiration Scale: Solid Food Trial Results 1 - no aspiration, contrast does not enter  airway   Diagnostic Statement Mild valleculae residue    Esophageal Phase of Swallow   Patient reports or presents with symptoms of esophageal dysphagia Yes   Esophageal comments Cervical esophagus only, cleared through UES. Hx of GERD known.    Swallow Eval: Clinical Impressions   Skilled Criteria for Therapy Intervention Skilled criteria met.  Treatment indicated.   Functional Assessment Scale (FAS) 5   Dysphagia Outcome Severity Scale (CHANCE) Level 5 - CHANCE   Treatment Diagnosis Mild oropharyngeal dysphagia   Diet texture recommendations Regular diet;Thin liquids  (Avoid mixed textures)   Recommended Feeding/Eating Techniques small sips/bites;maintain upright posture during/after eating for 30 mins;other (see comments)  (No talking during oral phase)   Rehab Potential good, to achieve stated therapy goals   Therapy Frequency other (see comments)   Predicted Duration of Therapy Intervention (days/wks) 1x follow up today   Anticipated Discharge Disposition home w/ assist   Risks and Benefits of Treatment have been explained. Yes   Patient, family and/or staff in agreement with Plan of Care Yes   Clinical Impression Comments Patient presents with mild oropharyngeal dysphagia characterized by premature pharyngeal entry of liquids, mild residue remaining in valleculae with puree and solid textures, and penetration of thin liquid residue from pyriform sinuses to laryngeal vestibule, not reaching vocal folds.  There was no aspiration observed.  Suspect increased risk of aspiration with mixed liquid and solid textures (cold cereal with milk, broth based soups with meats and vegetables, etc.).  Patient's family reporting he has a tendency to talk with a mouthful of food resulting in both spitting and coughing events.  Trained safe swallowing strategies, written and video education provided.    Total Session Time   SLP Eval: oral/pharyngeal swallow function, clinical minutes (74842) 25   SLP Eval: VideoFluoroscopic Swallow  function Minutes (69408) 20   Total Evaluation Time 45

## 2019-01-28 NOTE — TELEPHONE ENCOUNTER
Submitted request for documents to [facility] for upcoming OV 2/6/19 with Dr Pate, fax received back stating they still have not received an DEVAN from the patient.     Contacted patient to verify if DEVAN had been submitted, patient stated he wasn't sure. DEVAN form prepped for patient to sign at office visit with Dr Pate, will fax when signed.

## 2019-02-06 ENCOUNTER — DOCUMENTATION ONLY (OUTPATIENT)
Dept: CARDIOLOGY | Facility: CLINIC | Age: 84
End: 2019-02-06

## 2019-02-06 NOTE — PROGRESS NOTES
Patient scheduled to see Dr Pate 2/6/19 for post-hospital f/up with NM lexiscan prior. Patient cancelled appointment due to scheduling conflict. Scheduling contacted patient to attempt to reschedule both.     Message from scheduling-     Pt is choosing at this time due to weather not to go out and have this done. He will c/b in about a month to schedule. I did let him know if he is not feeling good to call us anytime to talk to our nurses.     Yessy

## 2019-03-02 NOTE — PROGRESS NOTES
[facility]    Hospitalist Progress Note  December 20, 2018    Assessment & Plan   Russ Loera is a 84 year old male admitted on 12/19/2018 for cough and fever who was found to have retrocardiac opacity indicating a likely pneumonia. He has remained lethargic overnight and CT head was actually repeated due to concern of change in mental status in conjunction with anticoagulated status and recent fall - no acute changes noted.      Community acquired pneumonia   - On adm: Left retrocardiac opacity, fever, cough; WBC normal range  - Influenza A/B antigen negative  - Overnight into 12/20 spiked temp 102.6, APAP given, improved this morning  - blood cultures pending - NGTD  - procalc 0.31; 1.6 12/20  - Started on azithro and zosyn in ed, continued  - Incentive spirometry  - prn albuterol nebs     History of PE - on warfarin for anticoagulation  - Pharmacy to dose warfarin - (goal of chromogenic factor X 20-40%)  - RA sats good  - Adm VBG unremarkable; troponin 0.042, ordered tni recheck on day of admission - never drawn, will recheck this morning 12/20 to ensure no increase     Recent falls  - Physical therapy consulted   - CT head completed on adm and repeated later that night 12/19 - chronic small vessel ischemic disease changes, no acute intracranial pathology     History of BPH  - Cummins placed in ED, okay to continue tonight - remove when possible     History of Parkinsons Disease  - Continue PTA carbidopa-levodopa 25-250mg tid    Diet: regular  DVT Prophylaxis: Warfarin  Cummins Catheter: in place, indication:  BPH  Code Status: Full code        Disposition Plan: Expected discharge: In 2-3 days pending improved of weakness and transition to oral antibiotics.     Addendum 1530:   Troponin #2 increased 0.307 from 0.042 last night. EKG showed lateral T wave inversions. Tni#3 fairly flat - 0.328. Given , consulted cardiology. He is currently therapeutic with warfarin per chromo F10. He is  SHANTELL HANSON  : 1946  10/03/17 17:05:03  ACCOUNT:  [id number]  HOME PHONE:  [phone]  WORK PHONE:     Patient notified of the results of echo and nuclear stress test, and v/u.  Patient will f/u with Dr. Davalos on 10/12/17 to discuss further treatment of PVD.  Patient is experiencing intermittent swelling of her right foot, and is keeping foot elevated for relief of discomfort and swelling.  Denies coldness or discoloration of the right foot at this time.      asymptomatic and doing a little better than yesterday when he presented.     Brian Conde MD    Text Page (7am to 6pm, M-F)    Interval History   Overnight he was noted to have a possible change in sensorium and CT head repeated - no change. PTA prn tramadol stopped, blood sugar appropriate.   No new complaints overnight. Feels like he is doing a little better today overall. No new pain or sob.     -Data reviewed today: I reviewed all new labs and imaging results over the last 24 hours. I personally reviewed no images or EKG's today.    Physical Exam   Temp: 99.1  F (37.3  C) Temp src: Oral BP: 142/80 Pulse: 83 Heart Rate: 80 Resp: 28 SpO2: 92 % O2 Device: None (Room air)    Vitals:    12/19/18 1130 12/19/18 1844 12/20/18 0647   Weight: 89.4 kg (197 lb) 91.7 kg (202 lb 3.2 oz) 91.2 kg (201 lb 1 oz)     Vital Signs with Ranges  Temp:  [99.1  F (37.3  C)-102.6  F (39.2  C)] 99.1  F (37.3  C)  Pulse:  [65-95] 83  Heart Rate:  [63-80] 80  Resp:  [13-28] 28  BP: (142-170)/() 142/80  SpO2:  [92 %-93 %] 92 %  I/O last 3 completed shifts:  In: 1100 [IV Piggyback:1100]  Out: 1550 [Urine:1550]    General:  Pleasant, no acute distress  HEENT: normocephalic, atraumatic, EOMI, MMM  CV: reg rate and irreg irreg rhythm, s1 s2 heard  Resp: a few basilar crackles on left, no increased work of breathing, no wheezing  Abdo: soft, nontender, nondistended, BS present, no rebound  Ext: nontender, well perfused, no edema  Neuro: AAOx3, moving all extremities, speech with stutter and labored with some tremor noted at times. No facial asym. Reduced facial expressions.       Medications     - MEDICATION INSTRUCTIONS -       sodium chloride 1,000 mL (12/20/18 0538)     Warfarin Therapy Reminder         azithromycin  250 mg Oral Daily     carbidopa-levodopa  1 tablet Oral 3 times per day     piperacillin-tazobactam  3.375 g Intravenous Q6H     senna-docusate  1 tablet Oral BID    Or     senna-docusate  2 tablet Oral BID       Data    Recent Labs   Lab 12/19/18  1125   WBC 8.3   HGB 12.9*   MCV 94      INR 2.05*      POTASSIUM 4.0   CHLORIDE 108   CO2 25   BUN 16   CR 1.07   ANIONGAP 8   RENU 8.4*   *   ALBUMIN 3.5   PROTTOTAL 6.7*   BILITOTAL 1.2   ALKPHOS 50   ALT 24   AST 21   TROPI 0.042       Recent Results (from the past 24 hour(s))   Chest XR,  PA & LAT    Narrative    CHEST TWO VIEWS December 19, 2018 12:36 PM     HISTORY: 84-year-old patient with cough and weakness.       Impression    IMPRESSION: Since CT exam on January 29, 2010, heart size is normal.  Patchy left retrocardiac opacity is noted. This could relate to  atelectasis, though pneumonia is a radiographically possibility.  Consider surveillance until resolution. No pleural effusion or  pneumothorax. 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    BOB DALLAS MD   Head CT w/o contrast    Narrative    CT OF THE HEAD WITHOUT CONTRAST December 19, 2018 1:19 PM     HISTORY: Head trauma, minor, GCS>=13, high clinical risk, initial  exam.    TECHNIQUE: 5 mm thick axial CT images of the head were acquired  without IV contrast material. Radiation dose for this scan was reduced  using automated exposure control, adjustment of the mA and/or kV  according to patient size, or iterative reconstruction technique.    COMPARISON: Head CT 7/13/2016.    FINDINGS: There is mild diffuse cerebral volume loss. There are subtle  patchy areas of decreased density in the cerebral white matter  bilaterally that are consistent with sequela of chronic small vessel  ischemic disease.    The ventricles and basal cisterns are within normal limits in  configuration given the degree of cerebral volume loss. There is no  midline shift. There are no extra-axial fluid collections.     No intracranial hemorrhage, mass or recent infarct.    The visualized paranasal sinuses are well-aerated. There is no  mastoiditis. There are no fractures of the visualized bones.       Impression     IMPRESSION: Diffuse cerebral volume loss and cerebral white matter  changes consistent with chronic small vessel ischemic disease. No  evidence for acute intracranial pathology.            ELNAA LANE MD   CT Head w/o Contrast    Narrative    CT SCAN OF THE HEAD WITHOUT CONTRAST   12/19/2018 9:04 PM     HISTORY: Altered level of consciousness (LOC), unexplained. On  coumadin, fall, possible change in mentation, rule out bleed.    TECHNIQUE:  Axial images of the head and coronal reformations without  IV contrast material. Radiation dose for this scan was reduced using  automated exposure control, adjustment of the mA and/or kV according  to patient size, or iterative reconstruction technique.    COMPARISON: 12/9/2018    FINDINGS:  Moderate volume loss is present. Patchy white matter hypoattenuation  likely represents chronic small vessel ischemic change. No evidence of  acute ischemia, hemorrhage, mass, mass effect, or hydrocephalus.  Scattered vascular calcifications are present. Skull base evaluation  is limited due to motion artifact without appreciable abnormality.      Impression    IMPRESSION:   No acute intracranial abnormality.    ADALGISA ECHEVERRIA MD

## 2019-03-05 ENCOUNTER — DOCUMENTATION ONLY (OUTPATIENT)
Dept: CARDIOLOGY | Facility: CLINIC | Age: 84
End: 2019-03-05

## 2019-03-05 NOTE — LETTER
March 5, 2019       TO: Russ Loera  [address]  [place] [address]       Dear Russ Loera,    You have outstanding orders for an office visit with one of our cardiologists, as well as for a nuclear stress test. These were ordered for follow up after your hospitalization in December of 2018.    Please call our clinic at [phone] to schedule your appointment as soon as possible.    Thank you,    [facility]

## 2019-03-05 NOTE — PROGRESS NOTES
Second attempt made by scheduling to coordinate after-hopsital f/up for patient. Due for NM lexiscan and OV in February. Per scheduling, the patient declined again to schedule  at this time as he has many other appts. Letter sent to patient notifying him of standing orders. No PCP on file. [facility] updated.

## 2019-06-04 ENCOUNTER — TRANSFERRED RECORDS (OUTPATIENT)
Dept: HEALTH INFORMATION MANAGEMENT | Facility: CLINIC | Age: 84
End: 2019-06-04

## 2020-11-27 ENCOUNTER — APPOINTMENT (OUTPATIENT)
Dept: GENERAL RADIOLOGY | Facility: CLINIC | Age: 85
End: 2020-11-27
Attending: EMERGENCY MEDICINE
Payer: MEDICARE

## 2020-11-27 ENCOUNTER — HOSPITAL ENCOUNTER (OUTPATIENT)
Facility: CLINIC | Age: 85
Setting detail: OBSERVATION
Discharge: HOME | End: 2020-11-29
Attending: EMERGENCY MEDICINE | Admitting: EMERGENCY MEDICINE
Payer: MEDICARE

## 2020-11-27 DIAGNOSIS — J06.9 UPPER RESPIRATORY TRACT INFECTION, UNSPECIFIED TYPE: ICD-10-CM

## 2020-11-27 DIAGNOSIS — R44.3 HALLUCINATIONS: ICD-10-CM

## 2020-11-27 DIAGNOSIS — G20.A1 PARKINSON'S DISEASE (H): Primary | ICD-10-CM

## 2020-11-27 PROBLEM — Z71.89 ACP (ADVANCE CARE PLANNING): Status: ACTIVE | Noted: 2017-06-03

## 2020-11-27 PROBLEM — Z86.711 HISTORY OF PULMONARY EMBOLISM: Status: ACTIVE | Noted: 2017-06-14

## 2020-11-27 PROBLEM — R79.89 SERUM CREATININE RAISED: Status: ACTIVE | Noted: 2017-05-29

## 2020-11-27 PROBLEM — J18.9 PNEUMONIA: Status: RESOLVED | Noted: 2018-12-19 | Resolved: 2020-11-27

## 2020-11-27 PROBLEM — D68.2 FACTOR X DEFICIENCY (H): Status: ACTIVE | Noted: 2018-08-23

## 2020-11-27 PROBLEM — H90.6 MIXED CONDUCTIVE AND SENSORINEURAL HEARING LOSS OF BOTH EARS: Status: ACTIVE | Noted: 2020-07-24

## 2020-11-27 PROBLEM — R50.9 FEBRILE ILLNESS: Status: ACTIVE | Noted: 2017-05-29

## 2020-11-27 PROBLEM — M43.10 ACQUIRED SPONDYLOLISTHESIS: Status: ACTIVE | Noted: 2018-08-23

## 2020-11-27 PROBLEM — Z79.01 LONG TERM CURRENT USE OF ANTICOAGULANT THERAPY: Status: ACTIVE | Noted: 2017-06-03

## 2020-11-27 LAB
ALBUMIN SERPL-MCNC: 3.9 G/DL (ref 3.4–5)
ALBUMIN UR-MCNC: NEGATIVE MG/DL
ALP SERPL-CCNC: 54 U/L (ref 40–150)
ALT SERPL W P-5'-P-CCNC: 9 U/L (ref 0–70)
AMPHETAMINES UR QL SCN: NEGATIVE
ANION GAP SERPL CALCULATED.3IONS-SCNC: 4 MMOL/L (ref 3–14)
APPEARANCE UR: CLEAR
AST SERPL W P-5'-P-CCNC: 10 U/L (ref 0–45)
BARBITURATES UR QL: NEGATIVE
BASOPHILS # BLD AUTO: 0 10E9/L (ref 0–0.2)
BASOPHILS NFR BLD AUTO: 0.1 %
BENZODIAZ UR QL: NEGATIVE
BILIRUB SERPL-MCNC: 0.8 MG/DL (ref 0.2–1.3)
BILIRUB UR QL STRIP: NEGATIVE
BUN SERPL-MCNC: 26 MG/DL (ref 7–30)
CALCIUM SERPL-MCNC: 8.7 MG/DL (ref 8.5–10.1)
CANNABINOIDS UR QL SCN: NEGATIVE
CHLORIDE SERPL-SCNC: 109 MMOL/L (ref 94–109)
CO2 SERPL-SCNC: 26 MMOL/L (ref 20–32)
COCAINE UR QL: NEGATIVE
COLOR UR AUTO: YELLOW
CREAT SERPL-MCNC: 0.86 MG/DL (ref 0.66–1.25)
DEPRECATED S PYO AG THROAT QL EIA: NEGATIVE
DIFFERENTIAL METHOD BLD: ABNORMAL
EOSINOPHIL # BLD AUTO: 0.3 10E9/L (ref 0–0.7)
EOSINOPHIL NFR BLD AUTO: 4.9 %
ERYTHROCYTE [DISTWIDTH] IN BLOOD BY AUTOMATED COUNT: 13.3 % (ref 10–15)
GFR SERPL CREATININE-BSD FRML MDRD: 78 ML/MIN/{1.73_M2}
GLUCOSE SERPL-MCNC: 96 MG/DL (ref 70–99)
GLUCOSE UR STRIP-MCNC: NEGATIVE MG/DL
HCT VFR BLD AUTO: 41.4 % (ref 40–53)
HGB BLD-MCNC: 14.2 G/DL (ref 13.3–17.7)
HGB UR QL STRIP: ABNORMAL
IMM GRANULOCYTES # BLD: 0 10E9/L (ref 0–0.4)
IMM GRANULOCYTES NFR BLD: 0.1 %
INR PPP: 2.19 (ref 0.86–1.14)
INTERPRETATION ECG - MUSE: NORMAL
KETONES UR STRIP-MCNC: NEGATIVE MG/DL
LEUKOCYTE ESTERASE UR QL STRIP: NEGATIVE
LYMPHOCYTES # BLD AUTO: 1.8 10E9/L (ref 0.8–5.3)
LYMPHOCYTES NFR BLD AUTO: 27.1 %
MCH RBC QN AUTO: 34 PG (ref 26.5–33)
MCHC RBC AUTO-ENTMCNC: 34.3 G/DL (ref 31.5–36.5)
MCV RBC AUTO: 99 FL (ref 78–100)
MONOCYTES # BLD AUTO: 0.6 10E9/L (ref 0–1.3)
MONOCYTES NFR BLD AUTO: 8.8 %
MUCOUS THREADS #/AREA URNS LPF: PRESENT /LPF
NEUTROPHILS # BLD AUTO: 4 10E9/L (ref 1.6–8.3)
NEUTROPHILS NFR BLD AUTO: 59 %
NITRATE UR QL: NEGATIVE
NRBC # BLD AUTO: 0 10*3/UL
NRBC BLD AUTO-RTO: 0 /100
OPIATES UR QL SCN: NEGATIVE
PCP UR QL SCN: NEGATIVE
PH UR STRIP: 5.5 PH (ref 5–7)
PLATELET # BLD AUTO: 199 10E9/L (ref 150–450)
POTASSIUM SERPL-SCNC: 4.1 MMOL/L (ref 3.4–5.3)
PROT SERPL-MCNC: 6.8 G/DL (ref 6.8–8.8)
RBC # BLD AUTO: 4.18 10E12/L (ref 4.4–5.9)
RBC #/AREA URNS AUTO: 106 /HPF (ref 0–2)
SODIUM SERPL-SCNC: 139 MMOL/L (ref 133–144)
SOURCE: ABNORMAL
SP GR UR STRIP: 1.02 (ref 1–1.03)
SPECIMEN SOURCE: NORMAL
UROBILINOGEN UR STRIP-MCNC: NORMAL MG/DL (ref 0–2)
WBC # BLD AUTO: 6.7 10E9/L (ref 4–11)
WBC #/AREA URNS AUTO: 3 /HPF (ref 0–5)

## 2020-11-27 PROCEDURE — G0378 HOSPITAL OBSERVATION PER HR: HCPCS

## 2020-11-27 PROCEDURE — 999N001174 HC STATISTIC STREP A RAPID: Performed by: EMERGENCY MEDICINE

## 2020-11-27 PROCEDURE — 87651 STREP A DNA AMP PROBE: CPT | Performed by: EMERGENCY MEDICINE

## 2020-11-27 PROCEDURE — 71045 X-RAY EXAM CHEST 1 VIEW: CPT

## 2020-11-27 PROCEDURE — 36415 COLL VENOUS BLD VENIPUNCTURE: CPT | Performed by: STUDENT IN AN ORGANIZED HEALTH CARE EDUCATION/TRAINING PROGRAM

## 2020-11-27 PROCEDURE — 85610 PROTHROMBIN TIME: CPT | Performed by: EMERGENCY MEDICINE

## 2020-11-27 PROCEDURE — 93005 ELECTROCARDIOGRAM TRACING: CPT

## 2020-11-27 PROCEDURE — C9803 HOPD COVID-19 SPEC COLLECT: HCPCS

## 2020-11-27 PROCEDURE — 99220 PR INITIAL OBSERVATION CARE,LEVEL III: CPT | Performed by: STUDENT IN AN ORGANIZED HEALTH CARE EDUCATION/TRAINING PROGRAM

## 2020-11-27 PROCEDURE — 85025 COMPLETE CBC W/AUTO DIFF WBC: CPT | Performed by: EMERGENCY MEDICINE

## 2020-11-27 PROCEDURE — 99285 EMERGENCY DEPT VISIT HI MDM: CPT | Mod: 25

## 2020-11-27 PROCEDURE — 81001 URINALYSIS AUTO W/SCOPE: CPT | Performed by: EMERGENCY MEDICINE

## 2020-11-27 PROCEDURE — 80053 COMPREHEN METABOLIC PANEL: CPT | Performed by: EMERGENCY MEDICINE

## 2020-11-27 PROCEDURE — 80307 DRUG TEST PRSMV CHEM ANLYZR: CPT | Performed by: STUDENT IN AN ORGANIZED HEALTH CARE EDUCATION/TRAINING PROGRAM

## 2020-11-27 PROCEDURE — U0003 INFECTIOUS AGENT DETECTION BY NUCLEIC ACID (DNA OR RNA); SEVERE ACUTE RESPIRATORY SYNDROME CORONAVIRUS 2 (SARS-COV-2) (CORONAVIRUS DISEASE [COVID-19]), AMPLIFIED PROBE TECHNIQUE, MAKING USE OF HIGH THROUGHPUT TECHNOLOGIES AS DESCRIBED BY CMS-2020-01-R: HCPCS | Performed by: EMERGENCY MEDICINE

## 2020-11-27 RX ORDER — ONDANSETRON 2 MG/ML
4 INJECTION INTRAMUSCULAR; INTRAVENOUS EVERY 6 HOURS PRN
Status: DISCONTINUED | OUTPATIENT
Start: 2020-11-27 | End: 2020-11-29 | Stop reason: HOSPADM

## 2020-11-27 RX ORDER — WARFARIN SODIUM 5 MG/1
2.5 TABLET ORAL
COMMUNITY

## 2020-11-27 RX ORDER — ACETAMINOPHEN 325 MG/1
650 TABLET ORAL EVERY 4 HOURS PRN
Status: DISCONTINUED | OUTPATIENT
Start: 2020-11-27 | End: 2020-11-29 | Stop reason: HOSPADM

## 2020-11-27 RX ORDER — AMOXICILLIN 250 MG
1 CAPSULE ORAL 2 TIMES DAILY
Status: DISCONTINUED | OUTPATIENT
Start: 2020-11-27 | End: 2020-11-29 | Stop reason: HOSPADM

## 2020-11-27 RX ORDER — LIDOCAINE 40 MG/G
CREAM TOPICAL
Status: DISCONTINUED | OUTPATIENT
Start: 2020-11-27 | End: 2020-11-29 | Stop reason: HOSPADM

## 2020-11-27 RX ORDER — POLYETHYLENE GLYCOL 3350 17 G/17G
17 POWDER, FOR SOLUTION ORAL DAILY
Status: DISCONTINUED | OUTPATIENT
Start: 2020-11-28 | End: 2020-11-29 | Stop reason: HOSPADM

## 2020-11-27 RX ORDER — ACETAMINOPHEN 500 MG
500 TABLET ORAL AT BEDTIME
COMMUNITY

## 2020-11-27 RX ORDER — ONDANSETRON 4 MG/1
4 TABLET, ORALLY DISINTEGRATING ORAL EVERY 6 HOURS PRN
Status: DISCONTINUED | OUTPATIENT
Start: 2020-11-27 | End: 2020-11-29 | Stop reason: HOSPADM

## 2020-11-27 RX ORDER — AMOXICILLIN 250 MG
2 CAPSULE ORAL 2 TIMES DAILY
Status: DISCONTINUED | OUTPATIENT
Start: 2020-11-27 | End: 2020-11-29 | Stop reason: HOSPADM

## 2020-11-27 RX ORDER — LANOLIN ALCOHOL/MO/W.PET/CERES
1 CREAM (GRAM) TOPICAL AT BEDTIME
COMMUNITY

## 2020-11-27 RX ORDER — CARBIDOPA/LEVODOPA 25MG-250MG
1 TABLET ORAL 3 TIMES DAILY
Status: DISCONTINUED | OUTPATIENT
Start: 2020-11-28 | End: 2020-11-29

## 2020-11-27 ASSESSMENT — ENCOUNTER SYMPTOMS
WEAKNESS: 1
SORE THROAT: 1
DIARRHEA: 0
SHORTNESS OF BREATH: 0
HALLUCINATIONS: 1
COUGH: 1

## 2020-11-27 ASSESSMENT — MIFFLIN-ST. JEOR: SCORE: 1593.44

## 2020-11-27 NOTE — ED TRIAGE NOTES
From assisted living at [facility].  Dry cough and sore throat.  Starting to hallucinate: visual.  Caregiver thought he had a fever, but no fever in triage.

## 2020-11-27 NOTE — ED PROVIDER NOTES
History     Chief Complaint:  Generalized Weakness, Cough, and Visual Hallucinations     The history is provided by the patient and the EMS personnel. History limited by: Poor Historian       Russ Loera is a 86 year old male on Coumadin on with a history of Parkinson's, who presents with generalized weakness, cough, and visual hallucinations. Per EMS, patient lives in assisted living in [place] and developed a dry cough and sore throat two days ago. Today, the caregiver describes the patient had visual hallucinations and thought the patient had a fever which was not present in triage; she called EMS. He denies chest pain, diarrhea, rash, shortness of breath.     Allergies:  Oxycodone  Fentanyl   Propofol   Sulfamethoxazole-Trimethoprim      Medications:    Coumadin   Sinemet   Neurontin   Ultram     Past Medical History:    Antiphospholipid syndrome  DJD  DVT  Factor V Leiden Mutation  GERD  Hypertension   Lyme Disease   Non rheumatic mitral valve regurgitation  Osteoarthritis   Parkinson's Disease  Plantar fasciitis of right foot  Prostate nodule with urinary obstruction   Pulmonary embolism   Pneumonia   Spinal stenosis   Tendonitis, achilles right  Transient cerebral ischemia    Cerebral artery occlusion with cerebral infarction     Past Surgical History:    C removal of kidney stone   C total knee arthoplasty   Herniorrhaphy inguinal   Incision limbal relaxing, keratotomy arcuate   Laminectomy lumbar fusion two levels - L3-L5  Phacoemulsification clear cornea with standard intraocular lens implant   Reverse arthroplasty shoulder   Tonsillectomy     Family History:    No past pertinent family history.    Social History:  The patient was accompanied to the ED by EMS.  Smoking Status: Never Smoker  Smokeless Tobacco: Never Used  Alcohol Use: Negative   Marital Status:       Review of Systems   HENT: Positive for sore throat.    Respiratory: Positive for cough. Negative for shortness of breath.   "  Cardiovascular: Negative for chest pain.   Gastrointestinal: Negative for diarrhea.   Skin: Negative for rash.   Neurological: Positive for weakness.   Psychiatric/Behavioral: Positive for hallucinations.   All other systems reviewed and are negative.    Physical Exam     Patient Vitals for the past 24 hrs:   BP Temp Temp src Pulse Resp SpO2 Height Weight   11/27/20 2100 (!) 156/103 98.1  F (36.7  C) Temporal 82 18 97 % -- --   11/27/20 2045 -- -- -- -- -- 96 % -- --   11/27/20 2030 (!) 172/99 -- -- 79 -- 98 % -- --   11/27/20 2015 -- -- -- -- -- 97 % -- --   11/27/20 2000 (!) 176/90 -- -- 69 -- 97 % -- --   11/27/20 1930 (!) 173/93 -- -- 69 -- 97 % -- --   11/27/20 1925 -- -- -- -- -- -- -- 90.7 kg (200 lb)   11/27/20 1915 -- -- -- -- -- 99 % -- --   11/27/20 1900 (!) 164/93 -- -- 65 -- 97 % -- --   11/27/20 1830 (!) 154/83 -- -- 68 -- -- -- --   11/27/20 1815 -- -- -- -- -- 98 % -- --   11/27/20 1800 (!) 147/76 -- -- 61 -- 97 % -- --   11/27/20 1742 -- -- -- -- -- 96 % -- --   11/27/20 1730 (!) 144/80 -- -- 66 -- -- -- --   11/27/20 1718 (!) 155/84 97.9  F (36.6  C) Temporal 62 18 97 % 1.778 m (5' 10\") --       Physical Exam  Constitutional: Elderly white male supine. Rare cough. No respiratory distress  HENT: No signs of trauma. op clear, no redness or discharge  Eyes: EOM are normal. Pupils are equal, round, and reactive to light.   Neck: Normal range of motion. No JVD present. No cervical adenopathy.  Cardiovascular: Regular rhythm.  Exam reveals no gallop and no friction rub.    No murmur heard.  Pulmonary/Chest: Bilateral breath sounds normal. No wheezes, rhonchi or rales.  Abdominal: Soft. No tenderness. No rebound or guarding. Umnbilical hernia non tender;  2+ femorlal pulses   Musculoskeletal: No edema. No tenderness. Right leg knee does not extend fully. Lacks 15 degrees of extension   Lymphadenopathy: No lymphadenopathy.   Neurological: Alert and oriented to person, place, and time. Normal strength. " Coordination normal.   Psych: No overt psychosis, calm affect  Skin: Skin is warm and dry. No rash noted. No erythema.       Emergency Department Course     ECG:  ECG taken at 1750, ECG read at 1756  Normal sinus rhythm with sinus arrhythmia   LAHB  Minimal voltage criteria for LVH, may be normal variant   Rate 60 bpm. VT interval 204. QRS duration 94. QT/QTc 430/430. P-R-T axes 18 -33 -9.    Imaging:  Radiology findings were communicated with the patient who voiced understanding of the findings.    XR Chest Port 1 View:  Portable chest. Lungs are clear. Heart is normal in size.  No pneumothorax. Possible trace amount of left pleural fluid. No  obvious right pleural effusion. Left shoulder joint replacement  hardware again noted.  Reading per radiology    Laboratory:  Laboratory findings were communicated with the patient who voiced understanding of the findings.    CBC: WBC 4.18, HGB 14.2,   CMP: Glucose 96, Creatinine 0.86    Drug abuse screen 77 urine:  All results negative   Streptococcus A Rapid Scr w Reflux to PCR: Throat Negative  Group A Streptococcus PCR Throat Swab: Pending    UA with Microscopic: Blood Large, RBC/, WBC/HPF 3, Mucous Present -   INR: 2.19    COVID-19 Virus (Coronavirus) by PCR: Pending.     Emergency Department Course:  Past medical records, nursing notes, and vitals reviewed.    1714 I performed an exam of the patient as documented above.     EKG obtained in the ED, see results above.   IV was inserted and blood was drawn for laboratory testing, results above.  The patient provided a urine sample here in the emergency department. This was sent for laboratory testing, findings above.  The patient was sent for a XR while in the emergency department, results above.     2000 I spoke with the family and discussed the results of his workup thus far.     Findings and plan explained to the Patient who consents to admission. Discussed the patient with Dr. Mei, who will admit the  patient to a bed for further monitoring, evaluation, and treatment.    I personally reviewed the laboratory and imaging results with the Patient and answered all related questions prior to admission.     Impression & Plan     Covid-19  Russ Loera was evaluated during a global COVID-19 pandemic, which necessitated consideration that the patient might be at risk for infection with the SARS-CoV-2 virus that causes COVID-19.   Applicable protocols for evaluation were followed during the patient's care.   COVID-19 was considered as part of the patient's evaluation. The plan for testing is: a test was obtained during this visit.    Medical Decision Making:  Russ Loera is a 86 year old male who was sent here from home after his care provider at the [facility] noticed that he seemed to be more confused, he was seeing things and people, was reaching out for things that wasn't there. He thought there was a fly on his walker but it was just the knob. He does have Parkinson's but he has never experienced any hallucinations or symptoms like this before. He is on blood thinners for his antiphospholipid syndrome. He has not fallen or had any head injury or trauma either by history of physical exam. Basic labs were obtained. His oropharynx was clear and there was no sign of infection. Rapid strep was sent. I did not notice any hallucinations, however, the nurse did . He noted him reaching for things that were not there. Since these are new hallucinations, I felt patient should be admitted for observation and further psychiatric evaluation.    Diagnosis:    ICD-10-CM    1. Hallucinations  R44.3 Streptococcus A Rapid Scr w Reflx to PCR     Symptomatic COVID-19 Virus (Coronavirus) by PCR     Group A Streptococcus PCR Throat Swab     Group A Streptococcus PCR Throat Swab     Drug abuse screen 77 urine (FL, [facility], [facility])     CANCELED: Symptomatic COVID-19 Virus (Coronavirus) by PCR   2. Upper respiratory tract infection, unspecified type   J06.9        Disposition:  Admitted under the care of Dr. Mei.     Scribe Disclosure:  I, Pj Rush, am serving as a scribe at 5:25 PM on 11/27/2020 to document services personally performed by No att. providers found based on my observations and the provider's statements to me.          Olaf Lee MD  11/27/20 7083

## 2020-11-27 NOTE — ED NOTES
Bed: ED07  Expected date:   Expected time:   Means of arrival:   Comments:  Jamil Weakness, Fever, cough poss covid 86 m

## 2020-11-28 ENCOUNTER — APPOINTMENT (OUTPATIENT)
Dept: CT IMAGING | Facility: CLINIC | Age: 85
End: 2020-11-28
Attending: INTERNAL MEDICINE
Payer: MEDICARE

## 2020-11-28 LAB
ANION GAP SERPL CALCULATED.3IONS-SCNC: 6 MMOL/L (ref 3–14)
BUN SERPL-MCNC: 20 MG/DL (ref 7–30)
CALCIUM SERPL-MCNC: 9.1 MG/DL (ref 8.5–10.1)
CHLORIDE SERPL-SCNC: 107 MMOL/L (ref 94–109)
CO2 SERPL-SCNC: 27 MMOL/L (ref 20–32)
CREAT SERPL-MCNC: 0.99 MG/DL (ref 0.66–1.25)
ERYTHROCYTE [DISTWIDTH] IN BLOOD BY AUTOMATED COUNT: 13 % (ref 10–15)
FACT X ACT/NOR PPP CHRO: 25 % (ref 70–130)
GFR SERPL CREATININE-BSD FRML MDRD: 68 ML/MIN/{1.73_M2}
GLUCOSE SERPL-MCNC: 85 MG/DL (ref 70–99)
HCT VFR BLD AUTO: 43.2 % (ref 40–53)
HGB BLD-MCNC: 14.7 G/DL (ref 13.3–17.7)
LABORATORY COMMENT REPORT: NORMAL
MCH RBC QN AUTO: 33.5 PG (ref 26.5–33)
MCHC RBC AUTO-ENTMCNC: 34 G/DL (ref 31.5–36.5)
MCV RBC AUTO: 98 FL (ref 78–100)
PLATELET # BLD AUTO: 218 10E9/L (ref 150–450)
POTASSIUM SERPL-SCNC: 4 MMOL/L (ref 3.4–5.3)
RBC # BLD AUTO: 4.39 10E12/L (ref 4.4–5.9)
SARS-COV-2 RNA SPEC QL NAA+PROBE: NEGATIVE
SARS-COV-2 RNA SPEC QL NAA+PROBE: NORMAL
SODIUM SERPL-SCNC: 140 MMOL/L (ref 133–144)
SPECIMEN SOURCE: NORMAL
STREP GROUP A PCR: NOT DETECTED
TSH SERPL DL<=0.005 MIU/L-ACNC: 1.25 MU/L (ref 0.4–4)
WBC # BLD AUTO: 6.8 10E9/L (ref 4–11)

## 2020-11-28 PROCEDURE — 99207 PR CDG-CODE CATEGORY CHANGED: CPT | Performed by: PSYCHIATRY & NEUROLOGY

## 2020-11-28 PROCEDURE — 36415 COLL VENOUS BLD VENIPUNCTURE: CPT | Performed by: STUDENT IN AN ORGANIZED HEALTH CARE EDUCATION/TRAINING PROGRAM

## 2020-11-28 PROCEDURE — 84443 ASSAY THYROID STIM HORMONE: CPT | Performed by: STUDENT IN AN ORGANIZED HEALTH CARE EDUCATION/TRAINING PROGRAM

## 2020-11-28 PROCEDURE — G0378 HOSPITAL OBSERVATION PER HR: HCPCS

## 2020-11-28 PROCEDURE — 85027 COMPLETE CBC AUTOMATED: CPT | Performed by: STUDENT IN AN ORGANIZED HEALTH CARE EDUCATION/TRAINING PROGRAM

## 2020-11-28 PROCEDURE — 99226 PR SUBSEQUENT OBSERVATION CARE,LEVEL III: CPT | Performed by: INTERNAL MEDICINE

## 2020-11-28 PROCEDURE — 250N000013 HC RX MED GY IP 250 OP 250 PS 637: Performed by: STUDENT IN AN ORGANIZED HEALTH CARE EDUCATION/TRAINING PROGRAM

## 2020-11-28 PROCEDURE — 70450 CT HEAD/BRAIN W/O DYE: CPT

## 2020-11-28 PROCEDURE — 80048 BASIC METABOLIC PNL TOTAL CA: CPT | Performed by: STUDENT IN AN ORGANIZED HEALTH CARE EDUCATION/TRAINING PROGRAM

## 2020-11-28 PROCEDURE — 82306 VITAMIN D 25 HYDROXY: CPT | Performed by: STUDENT IN AN ORGANIZED HEALTH CARE EDUCATION/TRAINING PROGRAM

## 2020-11-28 PROCEDURE — 99221 1ST HOSP IP/OBS SF/LOW 40: CPT | Performed by: PSYCHIATRY & NEUROLOGY

## 2020-11-28 RX ORDER — WARFARIN SODIUM 5 MG/1
5 TABLET ORAL
Status: COMPLETED | OUTPATIENT
Start: 2020-11-28 | End: 2020-11-28

## 2020-11-28 RX ADMIN — DOCUSATE SODIUM 50 MG AND SENNOSIDES 8.6 MG 1 TABLET: 8.6; 5 TABLET, FILM COATED ORAL at 11:39

## 2020-11-28 RX ADMIN — CARBIDOPA AND LEVODOPA 1 TABLET: 25; 250 TABLET ORAL at 03:43

## 2020-11-28 RX ADMIN — DOCUSATE SODIUM 50 MG AND SENNOSIDES 8.6 MG 1 TABLET: 8.6; 5 TABLET, FILM COATED ORAL at 01:08

## 2020-11-28 RX ADMIN — CARBIDOPA AND LEVODOPA 1 TABLET: 25; 250 TABLET ORAL at 17:23

## 2020-11-28 RX ADMIN — DOCUSATE SODIUM 50 MG AND SENNOSIDES 8.6 MG 1 TABLET: 8.6; 5 TABLET, FILM COATED ORAL at 21:05

## 2020-11-28 RX ADMIN — WARFARIN SODIUM 5 MG: 5 TABLET ORAL at 17:23

## 2020-11-28 RX ADMIN — CARBIDOPA AND LEVODOPA 1 TABLET: 25; 250 TABLET ORAL at 11:39

## 2020-11-28 NOTE — PROGRESS NOTES
RECEIVING UNIT ED HANDOFF REVIEW    ED Nurse Handoff Report was reviewed by: Rere Pelayo RN on November 27, 2020 at 9:02 PM

## 2020-11-28 NOTE — PROVIDER NOTIFICATION
MD Notification    Notified Person: MD    Notified Person Name: Dr. Dudley    Notification Date/Time: 11/28/2020 7444    Notification Interaction: Paged    Purpose of Notification: COVID-19 negative    Orders Received: OK to discontinue COVID precautions     Comments:

## 2020-11-28 NOTE — PROGRESS NOTES
"Summary: Visual hallucinations & PUI  DATE & TIME: 11/27 noc   Cognitive Concerns/ Orientation : A&O2-4, seeing water \"flooding\" onto floor. Restless at times, redirectable.   BEHAVIOR & AGGRESSION TOOL COLOR: Yellow  CIWA SCORE: NA   ABNL VS/O2: VSS/HTN (150-170)  MOBILITY: Strong A2, GB, W to pivot. Turning assistance needed in bed.  PAIN MANAGMENT: Sore throat relieved with rest  DIET: NPO  BOWEL/BLADDER: Urinary retention/voiding frequent, small amounts. Voids better when standing  ABNL LAB/BG: strep & utox negative.  Covid pending  DRAIN/DEVICES: PIV SL  TELEMETRY RHYTHM: NA  SKIN: Bruised & scabbed  TESTS/PROCEDURES: Head CT today  D/C DAY/GOALS/PLACE: Back to [facility] pending mentation  OTHER IMPORTANT INFO: Obs status. Psych consulted        Obs goals:  -diagnostic tests and consults completed and resulted : not met  -vital signs normal or at patient baseline : met  -tolerating oral intake to maintain hydration : not met, NPO  -returns to baseline functional status : not met  -safe disposition plan has been identified : not met  Nurse to notify provider when observation goals have been met and patient is ready for discharge.  "

## 2020-11-28 NOTE — CONSULTS
[facility]  Psychiatry Consultation      Patient name: Russ Loera   MRN: [id number]    Age: 86 year old    YOB: 1934    Identifying information:   The patient is a 86 year old White male who was admitted to the medical service on unit 66.    Reason for consult: Evaluate new onset hallucinations in the context of Parkinson's disease    History of present illness:   The patient has a history of Parkinson's disease who presented to the emergency room via EMS for evaluation of generalized weakness, cough, and visual hallucinations.  The symptoms were described as occurring acutely 2 days prior to his arrival while residing in his assisted living facility.  Staff at the facility contacted EMS seeking additional medical evaluation.  Records indicate that the patient was afebrile in the emergency room.  EKG revealed that his QTc interval was 430.  Chest x-ray, CT scan of his head, and urine drug screen were negative.  Urinalysis was not suspicious for a UTI.  He was admitted to the hospital for further medical work-up while awaiting COVID-19 testing.  Psychiatry was consulted to evaluate reports of hallucinations.  Records further indicate that staff at the assisted living facility noted that the patient was hallucinating when he appeared to be grabbing at things in the air that were not there.  On examination today, the patient was sitting in a chair in his room and appeared to be enjoying a snack comfortably.  He had just returned from his CT scan.  He had no mental health related complaints today, denying depressed mood and anxiety, and denying the presence of hallucinations at the moment.  He was not able to recall hallucinating at the assisted living facility.  He spent some time reviewing with me his dissatisfaction with the location that he was placed while in the emergency room.  He recalls feeling scared and alone and frequently calling out a staff for help.  He feels  much more comfortable in the hospital and in his current room.  He denied the presence of auditory or visual hallucinations.    Psychiatric Review of Systems:    -- Depressive episode: Denied symptoms including denial of suicidal thoughts.  -- Jill:  denies symptoms  -- Psychosis:  denies symptoms although records indicate that the patient was recently demonstrating the presence of visual hallucinations.  -- Anxiety: denies symptoms corresponding to GOOD or panic disorder  -- PTSD: denies symptoms  -- OCD: denies  symptoms  -- Eating disorder: denies symptoms    Psychiatric History:    No prior psychiatric treatment history reported.    Substance Use History:    Denies using illicit substances or alcohol corresponding to a diagnosis of abuse or dependence. No prior chemical dependency treatments reported.    Medical History:  Refer to the internal medicine notes which I reviewed.   Past Medical History:   Diagnosis Date     Antiphospholipid syndrome (H)      Degenerative joint disease      DVT (deep venous thrombosis) (H)      Factor V Leiden mutation (H)      Gastro-oesophageal reflux disease      GERD (gastroesophageal reflux disease)      History of BPH      Hypertension      Nonrheumatic mitral valve regurgitation      Osteoarthritis      Parkinsons disease (H)      Plantar fasciitis of right foot      PONV (postoperative nausea and vomiting)      Prostate nodule with urinary obstruction 7-9-12     Pulmonary embolism (H)      Spinal stenosis      Tendonitis, Achilles, right      Unspecified cerebral artery occlusion with cerebral infarction         Medications:    Current Facility-Administered Medications:      acetaminophen (TYLENOL) tablet 650 mg, 650 mg, Oral, Q4H PRN, Sohan Mei MD     carbidopa-levodopa (SINEMET)  MG per tablet 1 tablet, 1 tablet, Oral, TID, Sohan Mei MD, 1 tablet at 11/28/20 9699     lidocaine (LMX4) cream, , Topical, Q1H PRN, Sohan Mei MD     lidocaine 1 % 0.1-1 mL, 0.1-1  "mL, Other, Q1H PRN, Sohan Mei MD     melatonin tablet 1 mg, 1 mg, Oral, At Bedtime PRN, Sohan Mei MD     ondansetron (ZOFRAN-ODT) ODT tab 4 mg, 4 mg, Oral, Q6H PRN **OR** ondansetron (ZOFRAN) injection 4 mg, 4 mg, Intravenous, Q6H PRN, Sohan Mei MD     polyethylene glycol (MIRALAX) Packet 17 g, 17 g, Oral, Daily, Sohan Mei MD     senna-docusate (SENOKOT-S/PERICOLACE) 8.6-50 MG per tablet 1 tablet, 1 tablet, Oral, BID, 1 tablet at 11/28/20 1139 **OR** senna-docusate (SENOKOT-S/PERICOLACE) 8.6-50 MG per tablet 2 tablet, 2 tablet, Oral, BID, Sohan Mei MD     sodium chloride (PF) 0.9% PF flush 3 mL, 3 mL, Intracatheter, q1 min prn, Sohan Mei MD     sodium chloride (PF) 0.9% PF flush 3 mL, 3 mL, Intracatheter, Q8H, Sohan Mei MD, 3 mL at 11/28/20 0504     Warfarin Therapy Reminder (Check START DATE - warfarin may be starting in the FUTURE), 1 each, Does not apply, Continuous PRN, Sohan Mei MD     Social History:  Refer to the psychosocial assessment completed by the .  Social History     Social History Narrative     Not on file         Family History:    Family History   Problem Relation Age of Onset     No Known Problems Mother      No Known Problems Father        Vital Signs:    B/P: 182/91, T: 98.6, P: 60, R: 16  Estimated body mass index is 28.7 kg/m  as calculated from the following:    Height as of this encounter: 1.778 m (5' 10\").    Weight as of this encounter: 90.7 kg (200 lb).       Mental status examination:  Appearance:  Alert, fair hygiene, no acute distress  Attitude:  Attempts to be cooperative  Eye Contact: Fair  Mood: \"Fine\"  Affect: Mood congruent and appropriate  Speech: Slightly slow  Psychomotor Behavior: Moderate slowing noted  Thought Process: Linear and logical; not tangential or circumstantial or disorganized  Associations:  Logical; no loose associations noted  Thought Content:  No obvious paranoia, delusions, ideas of reference, or grandiosity noted. Denies " auditory or visual hallucinations. Denies suicidal Ideations. Denies homicidal ideations.  Insight: Partial  Judgment:  Fair  Oriented to: Time: Incorrectly states it is October 30, 2020.  Place: Aware that he is in the hospital.  Person: Intact.  Attention Span and Concentration:  Intact  Recent and Remote Memory: Limited  Language: Appropriate based on interviewing  Fund of Knowledge: Appropriate based on interviewing    Diagnoses:    Major neurocognitive disorder, Parkinson's disease, without behavioral disturbance, moderate  Unspecified psychosis (secondary to delirium versus anti-Parkinson's medications)     Recommendations:  During my examination with the patient, he did not demonstrate psychotic symptoms however appeared to be slightly disoriented with records indicating intermittent confusion.  Patient is currently being evaluated to determine if there are any underlying medical conditions that could be contributing to a delirium.  Chest x-ray, urinalysis, and head CT have been negative so far.    If visual hallucinations persist, consider a neurology consultation to comment on whether antiparkinson medications could be reduced in dosage to minimize the likelihood of psychosis as an adverse effect.  If it is determined that his anti-Parkinson's medications should not be reduced in dosage, utilizing antipsychotic medication therapy may then be considered.  A typical starting agent would be Seroquel 12.5 mg at bedtime.    Please reconsult with psychiatry as needed.   Ez Flores MD   Text Page

## 2020-11-28 NOTE — H&P
[facility]    History and Physical - Hospitalist Service       Date of Admission:  11/27/2020    Assessment & Plan   Russ Loera is a 86 year old male admitted on 11/27/2020. He presents with hallucinations / cough / sore throat.     Hallucinations  Assessment: presents with onset of visual hallucinations, which is new for the patient. Utox is negative. Initial work up is largely benign including CBC/BMP/LFTs. His UA is not suggestive of UTI. Overall no objective source of infection.   Plan:  - admit to observation  - Psychiatry eval  - Seroquel as needed  - Check Ammonia   - CT head in AM   - Check Utox  - Follow vitals/temp     URI  Assessment: CXR shows Lungs are clear. Heart is normal in size. No pneumothorax. Possible trace amount of left pleural fluid. No obvious right pleural effusion.   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  Factor V Leiden mutation (H)  Antiphospholipid syndrome (H)  Factor X deficiency (H)  Long term current use of anticoagulant therapy  Assessment: PTA on warfarin  Plan:  - Pharmacy to dose warfarin - (goal of chromogenic factor X 20-40%)    History of BPH  - Robbins placed in ED for inability to void  - Removed robbins, monitor for retention, scan and straight cath as needed  - not on pta meds, but care everywhere indicates previously on flomax - consider resuming if needed     Parkinsons Disease  Assessment: PTA on Sinemet  Plan:  - Continue PTA carbidopa-levodopa 25-250mg tid      Hypertension    Assessment/Plan: stable      GERD (gastroesophageal reflux disease)    Assessment/Plan: PPI as needed can be ordered       Diet: NPO for Medical/Clinical Reasons Except for: Ice Chips ADAT  DVT Prophylaxis: Warfarin  Robbins Catheter: not present  Code Status: Full Code FULL CODE  Rule Out COVID-19 Handoff:  Russ is NOT LOW SUSPICION PUI.   Follow these instructions:    If COVID test positive -> continue isolation precautions    If COVID test negative -> continue COVID-specific isolation precautions until reassessment in AM       Disposition Plan   Expected discharge: Tomorrow, recommended to prior living arrangement once mental status at baseline.  Entered: Sohan Mei MD 11/27/2020, 9:48 PM     The patient's care was discussed with the Patient and ED Provider.    Sohan Mei MD  [facility]  Contact information available via [facility] Paging/Directory      ______________________________________________________________________    Chief Complaint     AMS  Cough    History is obtained from the patient    History of Present Illness      Russ Loera is a 86 year old male with past medical history of Parkinson's disease, active 5 Leiden, antiphospholipid syndrome, DVT who presents for evaluation of cough, generalized weakness, visual hallucinations.    HPI limited secondary to patient's altered mental status.    Patient currently resides in assisted living facility in [place].  Staff reported that for the past 2 days, he has been having a dry cough and sore throat.  On the day of admission, his caregiver reported that he had visual hallucinations which is new for the patient.  There was also concerns of fevers, and given his constellation of symptoms, EMS was activated.  Otherwise there has been no reports of any falls or head trauma, no significant drug abuse or alcohol abuse by the patient.  No reports of any recent blood in stool, no weight loss or night sweats.  Patient denies any chest pain or shortness of breath, no calf pain or leg swelling.  He has no urinary complaints.  At this time he has no other complaints.    Review of Systems      The 10 point Review of Systems is negative other than noted in the HPI or here.     Past Medical History    I have reviewed this patient's medical history and updated it with pertinent  information if needed.   Past Medical History:   Diagnosis Date     Antiphospholipid syndrome (H)      Degenerative joint disease      DVT (deep venous thrombosis) (H)      Factor V Leiden mutation (H)      Gastro-oesophageal reflux disease      GERD (gastroesophageal reflux disease)      History of BPH      Hypertension      Nonrheumatic mitral valve regurgitation      Osteoarthritis      Parkinsons disease (H)      Plantar fasciitis of right foot      PONV (postoperative nausea and vomiting)      Prostate nodule with urinary obstruction 7-9-12     Pulmonary embolism (H)     Jan 2010     Spinal stenosis      Tendonitis, Achilles, right      Unspecified cerebral artery occlusion with cerebral infarction        Past Surgical History   I have reviewed this patient's surgical history and updated it with pertinent information if needed.  Past Surgical History:   Procedure Laterality Date     C REMOVAL OF KIDNEY STONE       C TOTAL KNEE ARTHROPLASTY  2011     HERNIORRHAPHY INGUINAL       INCISION LIMBAL RELAXING, KERATOTOMY ARCUATE  1/20/2014    Procedure: INCISION LIMBAL RELAXING, KERATOTOMY ARCUATE;;  Surgeon: Rosalio Montoya MD;  Location: [facility]     INCISION LIMBAL RELAXING, KERATOTOMY ARCUATE  2/10/2014    Procedure: INCISION LIMBAL RELAXING, KERATOTOMY ARCUATE;;  Surgeon: Rosalio Montoya MD;  [facility]     LAMINECTOMY LUMBAR MINIMALLY INVASIVE ONE LEVEL       LAMINECTOMY, FUSION LUMBAR TWO LEVELS, COMBINED      L3-L5     PHACOEMULSIFICATION CLEAR CORNEA WITH STANDARD INTRAOCULAR LENS IMPLANT  1/20/2014    Procedure: PHACOEMULSIFICATION CLEAR CORNEA WITH STANDARD INTRAOCULAR LENS IMPLANT;  RIGHT PHACOEMULSIFICATION CLEAR CORNEA WITH STANDARD INTRAOCULAR LENS IMPLANT, LIMBRAL RELAXING INCISION  ;  Surgeon: Rosalio Montoya MD;  Location: [facility]     PHACOEMULSIFICATION CLEAR CORNEA WITH STANDARD INTRAOCULAR LENS IMPLANT  2/10/2014    Procedure: PHACOEMULSIFICATION CLEAR CORNEA WITH STANDARD INTRAOCULAR LENS  IMPLANT;  LEFT PHACOEMULSIFICATION CLEAR CORNEA WITH STANDARD INTRAOCULAR LENS IMPLANT WITH LIMBAL RELAXING INCISION;  Surgeon: Rosalio Montoya MD;  Location:  [facility]     REVERSE ARTHROPLASTY SHOULDER  12/4/2012    Procedure: REVERSE ARTHROPLASTY SHOULDER;  LEFT REVERSE TOTAL SHOULDER ARTHROPLASTY;  Surgeon: Tre Patel MD;  [facility]     TONSILLECTOMY         Social History   I have reviewed this patient's social history and updated it with pertinent information if needed.  Social History     Tobacco Use     Smoking status: Never Smoker     Smokeless tobacco: Never Used   Substance Use Topics     Alcohol use: No     Drug use: No       Family History   I have reviewed this patient's family history and updated it with pertinent information if needed.  Family History   Problem Relation Age of Onset     No Known Problems Mother      No Known Problems Father      Prior to Admission Medications   Prior to Admission Medications   Prescriptions Last Dose Informant Patient Reported? Taking?   acetaminophen (TYLENOL) 500 MG tablet  Self Yes No   Sig: Take 500 mg by mouth 4 times daily    carbidopa-levodopa (SINEMET)  MG per tablet  Self Yes No   Sig: Take 1 tablet by mouth 3 times daily 0400; 1100; 1700   warfarin (COUMADIN) 5 MG tablet  Self Yes No   Sig: Take 5 mg by mouth daily      Facility-Administered Medications: None     Allergies   Allergies   Allergen Reactions     Oxycodone Anaphylaxis     Fentanyl Nausea     Propofol Nausea and Vomiting       Physical Exam   Vital Signs: Temp: 98.4  F (36.9  C) Temp src: Oral BP: (!) 166/115 Pulse: 113   Resp: 18 SpO2: 99 % O2 Device: None (Room air)    Weight: 200 lbs 0 oz    Constitutional: confused, otherwise no apparent distress  Eyes: Lids and lashes normal, pupils equal, round and reactive to light   ENT: Normocephalic, without obvious abnormality, atraumatic, sinuses nontender on palpation   Hematologic / Lymphatic: no cervical lymphadenopathy    Respiratory: CTABL   Cardiovascular: tachycardic with regular rhythm with no m/r/g   GI: Normal bowel sounds, soft, non-distended, non-tender.   Skin: normal skin color, texture, turgor   Musculoskeletal: There is no redness, warmth, or swelling of the joints. Full range of motion noted.   Neurologic: Awake, alert, oriented to name, place. Cranial nerves II-XII are grossly intact. Motor is 5 out of 5 bilaterally. Sensory is intact.   Neuropsychiatric: normal mood and affect    Data   Data reviewed today: I reviewed all medications, new labs and imaging results over the last 24 hours. I personally reviewed the EKG tracing showing NSR and the chest x-ray image(s) showing see below.    Most Recent 3 CBC's:  Recent Labs   Lab Test 11/27/20  1731 01/11/19 12/22/18  0903 12/21/18  0824   WBC 6.7  --  7.5 9.1   HGB 14.2 12.8* 11.3* 12.0*   MCV 99 98 94 95     --  147* 139*     Most Recent 3 BMP's:  Recent Labs   Lab Test 11/27/20  1731 01/11/19 12/22/18  0903    139 144   POTASSIUM 4.1 4.3 3.4   CHLORIDE 109 106 113*   CO2 26 24 22   BUN 26 17 16   CR 0.86 1.01 1.06   ANIONGAP 4 9 9   RENU 8.7 9.5 8.0*   GLC 96 92 89     Most Recent 2 LFT's:  Recent Labs   Lab Test 11/27/20  1731 12/19/18  1125   AST 10 21   ALT 9 24   ALKPHOS 54 50   BILITOTAL 0.8 1.2     Most Recent 3 INR's:  Recent Labs   Lab Test 11/27/20  1731 12/19/18  1125 11/12/16  1145   INR 2.19* 2.05* 2.24*     Anticoagulation Dose History     Recent Dosing and Labs Latest Ref Rng & Units 11/12/2016 12/19/2018 12/20/2018 12/21/2018 12/22/2018 12/23/2018 11/27/2020    Warfarin 4 mg - - - - - 4 mg - -    Warfarin 5 mg - - - 5 mg 5 mg - - -    INR 0.86 - 1.14 2.24(H) 2.05(H) - - - - 2.19(H)    Chromogenic Factor 10 70 - 130 % 21(L) - 26(L) 27(L) 20(L) 22(L) -          Most Recent 3 Creatinines:  Recent Labs   Lab Test 11/27/20  1731 01/11/19 12/22/18  0903   CR 0.86 1.01 1.06     Most Recent 3 Hemoglobins:  Recent Labs   Lab Test 11/27/20  1731  01/11/19 12/22/18  0903   HGB 14.2 12.8* 11.3*     Most Recent 3 Troponin's:  Recent Labs   Lab Test 12/20/18  1229 12/20/18  0855 12/19/18  1125   TROPI 0.328* 0.307* 0.042     Most Recent 3 BNP's:No lab results found.  Most Recent D-dimer:No lab results found.  Most Recent Cholesterol Panel:  Recent Labs   Lab Test 05/25/16   CHOL 186      HDL 39   TRIG 114     Most Recent 6 Bacteria Isolates From Any Culture (See EPIC Reports for Culture Details):  Recent Labs   Lab Test 12/19/18  1347 12/19/18  1320 12/06/12  1500   CULT No growth No growth Test canceled by PCU/Clinic Charge credited TALKED TO [facility] AT 1320     Most Recent TSH and T4:No lab results found.  Most Recent Hemoglobin A1c:No lab results found.  Most Recent 6 glucoses:  Recent Labs   Lab Test 11/27/20  1731 01/11/19 12/22/18  0903 12/21/18  0824 12/19/18  1125 07/18/16   GLC 96 92 89 93 106* 164*     Most Recent Urinalysis:  Recent Labs   Lab Test 11/27/20  1913   COLOR Yellow   APPEARANCE Clear   URINEGLC Negative   URINEBILI Negative   URINEKETONE Negative   SG 1.022   UBLD Large*   URINEPH 5.5   PROTEIN Negative   NITRITE Negative   LEUKEST Negative   RBCU 106*   WBCU 3     Recent Results (from the past 24 hour(s))   XR Chest Port 1 View    Narrative    XR PORTABLE CHEST ONE VIEW   11/27/2020 6:43 PM     HISTORY: Cough, weak.    COMPARISON: Chest x-ray 12/19/2018.      Impression    IMPRESSION: Portable chest. Lungs are clear. Heart is normal in size.  No pneumothorax. Possible trace amount of left pleural fluid. No  obvious right pleural effusion. Left shoulder joint replacement  hardware again noted.    RUSSELL CHÁVEZ MD

## 2020-11-28 NOTE — PHARMACY-ADMISSION MEDICATION HISTORY
Pharmacy Medication History  Admission medication history interview status for the 11/27/2020  admission is complete. See EPIC admission navigator for prior to admission medications       Medication history sources: Caregiver, Amie  Location of interview: Phone  Adherence Assessment: Good    Significant changes made to the medication list:  - Added melatonin and prevagen  - Updated warfarin dosing (was previously 5 mg daily)    Additional medication history information:   Warfarin:   Patient is taking warfarin for the indication of Factor V Leiden, Hx PE with an CFX goal of 20-40%.   Current dosing regimen is 2.5 mg Thursday, Sunday and 5 mg all other days.   Last dose (5 mg) was taken 11/27/2020 in the morning.    Medication reconciliation completed by provider prior to medication history? Yes    Time spent in this activity: 20 minutes      Prior to Admission medications    Medication Sig Last Dose Taking? Auth Provider   acetaminophen (TYLENOL) 500 MG tablet Take 500 mg by mouth At Bedtime 11/26/2020 at hs Yes Unknown, Entered By History   acetaminophen (TYLENOL) 500 MG tablet Take 1,000 mg by mouth 2 times daily 1000, 1600 11/27/2020 at 1600 Yes Reported, Patient   Apoaequorin (PREVAGEN PO) Take 1 tablet by mouth daily 11/27/2020 at Unknown time Yes Unknown, Entered By History   carbidopa-levodopa (SINEMET)  MG per tablet Take 1 tablet by mouth 3 times daily 0500; 1100; 1700 11/27/2020 at x3 doses Yes Unknown, Entered By History   melatonin 3 MG tablet Take 1 mg by mouth At Bedtime 11/26/2020 at hs Yes Unknown, Entered By History   warfarin (COUMADIN) 5 MG tablet Take 5 mg by mouth five times a week Monday, Tuesday, Wednesday, Friday, and Saturday 11/27/2020 at 5 mg Yes Unknown, Entered By History   warfarin ANTICOAGULANT (COUMADIN) 5 MG tablet Take 2.5 mg by mouth twice a week Thursday and Sunday 11/26/2020 at 2.5 mg Yes Unknown, Entered By History       The information provided in this note is only as  accurate as the sources available at the time of the update(s).

## 2020-11-28 NOTE — PROGRESS NOTES
Spoke to Dr. Leahy. Consult request was meant for Neurology, not Neurosurgery.     Wicho Krishna PA-C on 11/28/2020 at 3:56 PM

## 2020-11-28 NOTE — PROGRESS NOTES
[facility]    Medicine Progress Note - Hospitalist Service       Date of Admission:  11/27/2020  Assessment & Plan       Russ Loera is a 86 year old male admitted on 11/27/2020. He presents with hallucinations / cough / sore throat.     Hallucinations  Assessment: presents with new onset of visual hallucinations. Utox is negative. Initial work up is largely benign including CBC/BMP/LFTs/UA  -Head CT negative   -Appreciate psych input, recommending neuro evaluation for possible adjustment of his antiparkinson medication and recommending adjusting antipsychotic medication only if neurology recommends no adjustment in doses  -Neuro consult requested  -Check B12, folate, ammonia levels, TSH     Sore throat  -Strep group A PCR not detected  - COVID-19 PCR, negative, low suspicion,  -Patient currently without any symptoms discontinue precaution     History of PE - on warfarin for anticoagulation  Factor V Leiden mutation (H)  Antiphospholipid syndrome (H)  Factor X deficiency (H)  Long term current use of anticoagulant therapy  Assessment: PTA on warfarin  Plan:  - Pharmacy to dose warfarin - (goal of chromogenic factor X 20-40%)     History of BPH  - Robbins placed in ED for inability to void  - Removed robbins, during admission, patient has been able to void today per nursing     Parkinsons Disease  Assessment: PTA on Sinemet  Plan:  - Continue PTA carbidopa-levodopa 25-250mg tid  -Neurology consult as above to see if dose adjustment needed      Hypertension    Assessment/Plan: stable       GERD (gastroesophageal reflux disease)    Assessment/Plan: PPI as needed can be ordered          Diet: Regular Diet Adult    DVT Prophylaxis: Warfarin  Robbins Catheter: not present  Code Status: Full Code           Disposition Plan   Expected discharge: 1 to 2 days, recommended to Prior living arrangement versus TCU pending PT evaluation once Cleared by neurology and patient at baseline.  Entered: Tosin  MD Luis Enrique 11/28/2020, 3:27 PM       The patient's care was discussed with the Bedside Nurse and Patient.    Tosin Dudley MD  Hospitalist Service  [facility]  Contact information available via [facility] Paging/Directory    ______________________________________________________________________    Interval History   Patient intermittently confused during admission however appropriately answers most of the question.  No other nursing concerns.  Reported intermittent visual hallucination at the time of my evaluation.  Appears hard of hearing    Data reviewed today: I reviewed all medications, new labs and imaging results over the last 24 hours. I personally reviewed the head CT image(s) showing No acute abnormality.    Physical Exam   Vital Signs: Temp: 97.8  F (36.6  C) Temp src: Oral BP: 95/62 Pulse: 67   Resp: 16 SpO2: 92 % O2 Device: None (Room air)    Weight: 200 lbs 0 oz  Exam:  Constitutional: Awake, alert and no distress. Appears comfortable  Head: Normocephalic. No masses, lesions, tenderness or abnormalities  ENT: ENT exam normal, no neck nodes or sinus tenderness  Cardiovascular: RRR.  No murmurs, no rubs or JVD  Respiratory: Normal WOB,b/l equal air entry, no wheezes or crackles   Gastrointestinal: Abdomen soft, non-tender. BS normal. No masses, organomegaly  : Deferred  Extremities : No edema , no clubbing or cyanosis      Data   Recent Labs   Lab 11/28/20  1055 11/27/20  1731   WBC 6.8 6.7   HGB 14.7 14.2   MCV 98 99    199   INR  --  2.19*    139   POTASSIUM 4.0 4.1   CHLORIDE 107 109   CO2 27 26   BUN 20 26   CR 0.99 0.86   ANIONGAP 6 4   RENU 9.1 8.7   GLC 85 96   ALBUMIN  --  3.9   PROTTOTAL  --  6.8   BILITOTAL  --  0.8   ALKPHOS  --  54   ALT  --  9   AST  --  10     Recent Results (from the past 24 hour(s))   XR Chest Port 1 View    Narrative    XR PORTABLE CHEST ONE VIEW   11/27/2020 6:43 PM     HISTORY: Cough, weak.    COMPARISON: Chest x-ray 12/19/2018.       Impression    IMPRESSION: Portable chest. Lungs are clear. Heart is normal in size.  No pneumothorax. Possible trace amount of left pleural fluid. No  obvious right pleural effusion. Left shoulder joint replacement  hardware again noted.    RUSSELL CHÁVEZ MD   CT Head w/o Contrast    Narrative    CT SCAN OF THE HEAD WITHOUT CONTRAST November 28, 2020 10:58 AM     HISTORY: Hallucination.    TECHNIQUE: Axial images of the head and coronal reformations without  IV contrast material. Radiation dose for this scan was reduced using  automated exposure control, adjustment of the mA and/or kV according  to patient size, or iterative reconstruction technique.    COMPARISON: Head CT 12/19/2019.    FINDINGS: Moderate volume loss is present. White matter  hypoattenuation likely represents moderate chronic small vessel  ischemic change. The cerebral hemispheres, brainstem, and cerebellum  otherwise demonstrate normal morphology and attenuation. No evidence  of acute ischemia, hemorrhage, mass, mass effect or hydrocephalus.     The visualized calvarium, tympanic cavities, mastoid cavities, and  paranasal sinuses are unremarkable.      Impression    IMPRESSION: No acute intracranial abnormality.    ADALGISA ECHEVERRIA MD     Medications     Warfarin Therapy Reminder         carbidopa-levodopa  1 tablet Oral TID     polyethylene glycol  17 g Oral Daily     senna-docusate  1 tablet Oral BID    Or     senna-docusate  2 tablet Oral BID     sodium chloride (PF)  3 mL Intracatheter Q8H

## 2020-11-28 NOTE — PHARMACY-ANTICOAGULATION SERVICE
Clinical Pharmacy - Warfarin Dosing Consult     Pharmacy has been consulted to manage this patient s warfarin therapy.    INR   Date Value Ref Range Status   11/27/2020 2.19 (H) 0.86 - 1.14 Final   12/19/2018 2.05 (H) 0.86 - 1.14 Final     Chromogenic Factor 10   Date Value Ref Range Status   12/23/2018 22 (L) 70 - 130 % Final     Comment:     Therapeutic Range:  A Chromogenic Factor 10 level of approximately 20-40%   inversely correlates with an INR of 2-3 for patients receiving Warfarin.   Chromogenic Factor 10 levels below 20% indicate an INR greater than 3 and   levels above 40% indicate an INR less than 2.         Recommend no warfarin dose on admission as already received dose on 11/27/2020.  Pharmacy will monitor Russ VALADEZ [place] daily and order warfarin doses to achieve specified goal.      Please contact pharmacy as soon as possible if the warfarin needs to be held for a procedure or if the warfarin goals change.

## 2020-11-28 NOTE — PROVIDER NOTIFICATION
MD Notification    Notified Person: MD    Notified Person Name: Dr. Dudley    Notification Date/Time: 11/28/2020 0900    Notification Interaction: Paged    Purpose of Notification: Pt appears to be getting more confused, only oriented to self    Orders Received: Head CT changed to STAT     Comments:

## 2020-11-28 NOTE — ED NOTES
[facility]  ED Nurse Handoff Report    ED Chief complaint: Generalized Weakness, Cough, and Pharyngitis      ED Diagnosis:   Final diagnoses:   None       Code Status: See MD note    Allergies:   Allergies   Allergen Reactions     Oxycodone Anaphylaxis     Fentanyl Nausea     Propofol Nausea and Vomiting       Patient Story: From [facility] assisted living in [place].  Caregiver thought patient might have a fever, and he was complaining of a sore throat.  Then, he was hallucinating and grabbing things that aren't there.  Afebrile here, and no complaints of sore throat, but patient seems somewhat confused.  Blood drawn, chest xray complete.  Feeling weaker.  Focused Assessment:  Alert and oriented to self    Treatments and/or interventions provided: support  Patient's response to treatments and/or interventions: resting in bed    To be done/followed up on inpatient unit:  VS    Does this patient have any cognitive concerns?: Disoriented to time, Disoriented to place and Disoriented to situation    Activity level - Baseline/Home:  Stand with Assist  Activity Level - Current:   Up with 2 assist.    Patient's Preferred language: English   Needed?: No    Isolation: None and COVID r/o and special precautions  Infection: Not Applicable  COVID r/o and special precautions  Patient tested for COVID 19 prior to admission: YES  Bariatric?: No    Vital Signs:   Vitals:    11/27/20 1730 11/27/20 1742 11/27/20 1800 11/27/20 1815   BP: (!) 144/80  (!) 147/76    Pulse: 66  61    Resp:       Temp:       TempSrc:       SpO2:  96% 97% 98%   Height:           Cardiac Rhythm:     Was the PSS-3 completed:   Yes  What interventions are required if any?               Family Comments: none  OBS brochure/video discussed/provided to patient/family: Yes              Name of person given brochure if not patient: na              Relationship to patient: na    For the majority of the shift this patient's behavior  was Green.   Behavioral interventions performed were encouragement.    ED NURSE PHONE NUMBER: ER

## 2020-11-29 ENCOUNTER — APPOINTMENT (OUTPATIENT)
Dept: PHYSICAL THERAPY | Facility: CLINIC | Age: 85
End: 2020-11-29
Attending: INTERNAL MEDICINE
Payer: MEDICARE

## 2020-11-29 VITALS
HEIGHT: 70 IN | DIASTOLIC BLOOD PRESSURE: 96 MMHG | BODY MASS INDEX: 28.63 KG/M2 | RESPIRATION RATE: 18 BRPM | HEART RATE: 76 BPM | TEMPERATURE: 97.8 F | WEIGHT: 200 LBS | OXYGEN SATURATION: 97 % | SYSTOLIC BLOOD PRESSURE: 165 MMHG

## 2020-11-29 LAB
AMMONIA PLAS-SCNC: 27 UMOL/L (ref 10–50)
FACT X ACT/NOR PPP CHRO: 23 % (ref 70–130)
FOLATE SERPL-MCNC: 14.3 NG/ML
VIT B12 SERPL-MCNC: 441 PG/ML (ref 193–986)

## 2020-11-29 PROCEDURE — 82607 VITAMIN B-12: CPT | Performed by: STUDENT IN AN ORGANIZED HEALTH CARE EDUCATION/TRAINING PROGRAM

## 2020-11-29 PROCEDURE — 97530 THERAPEUTIC ACTIVITIES: CPT | Mod: GP

## 2020-11-29 PROCEDURE — 82140 ASSAY OF AMMONIA: CPT | Performed by: INTERNAL MEDICINE

## 2020-11-29 PROCEDURE — 250N000013 HC RX MED GY IP 250 OP 250 PS 637: Performed by: STUDENT IN AN ORGANIZED HEALTH CARE EDUCATION/TRAINING PROGRAM

## 2020-11-29 PROCEDURE — 82746 ASSAY OF FOLIC ACID SERUM: CPT | Performed by: INTERNAL MEDICINE

## 2020-11-29 PROCEDURE — 99217 PR OBSERVATION CARE DISCHARGE: CPT | Performed by: INTERNAL MEDICINE

## 2020-11-29 PROCEDURE — G0378 HOSPITAL OBSERVATION PER HR: HCPCS

## 2020-11-29 PROCEDURE — 97161 PT EVAL LOW COMPLEX 20 MIN: CPT | Mod: GP

## 2020-11-29 PROCEDURE — 36415 COLL VENOUS BLD VENIPUNCTURE: CPT | Performed by: STUDENT IN AN ORGANIZED HEALTH CARE EDUCATION/TRAINING PROGRAM

## 2020-11-29 PROCEDURE — 82306 VITAMIN D 25 HYDROXY: CPT | Performed by: STUDENT IN AN ORGANIZED HEALTH CARE EDUCATION/TRAINING PROGRAM

## 2020-11-29 RX ORDER — WARFARIN SODIUM 2.5 MG/1
2.5 TABLET ORAL
Status: COMPLETED | OUTPATIENT
Start: 2020-11-29 | End: 2020-11-29

## 2020-11-29 RX ORDER — CARBIDOPA/LEVODOPA 25MG-250MG
1 TABLET ORAL 2 TIMES DAILY
Status: DISCONTINUED | OUTPATIENT
Start: 2020-11-29 | End: 2020-11-29 | Stop reason: HOSPADM

## 2020-11-29 RX ORDER — CARBIDOPA/LEVODOPA 25MG-250MG
1 TABLET ORAL 2 TIMES DAILY
Start: 2020-11-29

## 2020-11-29 RX ADMIN — WARFARIN SODIUM 2.5 MG: 2.5 TABLET ORAL at 17:01

## 2020-11-29 RX ADMIN — CARBIDOPA AND LEVODOPA 1 TABLET: 25; 250 TABLET ORAL at 10:47

## 2020-11-29 RX ADMIN — CARBIDOPA AND LEVODOPA 1 TABLET: 25; 250 TABLET ORAL at 05:14

## 2020-11-29 NOTE — DISCHARGE SUMMARY
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   Follow-up with Dr. Chambers(your primary neurologist) within 2 weeks           Unresulted Labs Ordered in the Past 30 Days of this Admission     No orders found from 10/28/2020 to 11/28/2020.        Discharge Disposition   Discharged to assisted living  Condition at discharge: Stable    Hospital Course         Russ Loera is a 86 year old male admitted on 11/27/2020. He presents with hallucinations / cough / sore throat.     Hallucinations/confusion  Parkinson's disease  Assessment: presents with new onset of visual hallucinations. Utox is negative. Initial work up is largely benign including CBC/BMP/LFTs/UA  -Head CT negative, B12 folate ammonia level and TSH unremarkable  -Psych evaluated the patient and recommended neuro evaluation for possible adjustment of his antiparkinson medication   -Neuro also evaluated the patient and decreased his PTA carbidopa levodopa 25/250 3 times daily to 2 times daily and recommended outpatient follow-up with his primary neurologist.    Sore throat  -Strep group A PCR not detected  - COVID-19 PCR, negative, low suspicion,  -Patient currently without any symptoms, precautions discontinued     History of PE - on warfarin for anticoagulation  Factor V Leiden mutation (H)  Antiphospholipid  syndrome (H)  Factor X deficiency (H)  Long term current use of anticoagulant therapy  Assessment: PTA on warfarin  - Pharmacy and is to his warfarin while in-house- (goal of chromogenic factor X 20-40%), outpatient follow-up with his PCP as previous schedule     History of BPH  - Robbins placed in ED for inability to void  - Removed robbins, during admission, patient has not had any problem regarding voiding since removal of Robbins catheter throughout his hospital stay      Hypertension    Assessment/Plan: stable       GERD (gastroesophageal reflux disease)    Assessment/Plan: PPI as needed can be ordered         Consultations This Hospital Stay   PHARMACY TO DOSE WARFARIN  PSYCHIATRY IP CONSULT  NEUROLOGY IP CONSULT  PHYSICAL THERAPY ADULT IP CONSULT  OCCUPATIONAL THERAPY ADULT IP CONSULT  PHYSICAL THERAPY ADULT IP CONSULT  CARE MANAGEMENT / SOCIAL WORK IP CONSULT    Code Status   Full Code    Time Spent on this Encounter   I, Tosin Dudley MD, personally saw the patient today and spent less than or equal to 30 minutes discharging this patient.       Tosin Dudley MD  [place] [address] [facility]  [address] [place] [address]  Phone: [phone]  ______________________________________________________________________    Physical Exam   Vital Signs: Temp: 97.6  F (36.4  C) Temp src: Oral BP: 114/59 Pulse: 55   Resp: 18 SpO2: 93 % O2 Device: None (Room air)    Weight: 200 lbs 0 oz  Exam:  Constitutional: Awake, alert and no distress. Appears comfortable, very hard of hearing  Head: Normocephalic. No masses, lesions, tenderness or abnormalities  ENT: ENT exam normal, no neck nodes or sinus tenderness  Cardiovascular: RRR.  2+ murmurs, no rubs or JVD  Respiratory: Normal WOB,b/l equal air entry, no wheezes or crackles   Gastrointestinal: Abdomen soft, non-tender. BS normal. No masses, organomegaly  : Deferred  Extremities : No edema , no clubbing or cyanosis         Primary Care  Physician   Trung Galvez    Discharge Orders      Home Care PT Referral for Hospital Discharge      Home Care OT Referral for Hospital Discharge      Reason for your hospital stay    Hallucinations.     Follow-up and recommended labs and tests     Follow up with primary care provider, Trung Galvez, within 7 days for hospital follow- up.   Follow-up with Dr. Chambers(your primary neurologist) within 2 weeks     Activity    Your activity upon discharge: activity as tolerated     MD face to face encounter    Documentation of Face to Face and Certification for Home Health Services    I certify that patient: Russ Loera is under my care and that I, or a nurse practitioner or physician's assistant working with me, had a face-to-face encounter that meets the physician face-to-face encounter requirements with this patient on: 11/29/2020.    This encounter with the patient was in whole, or in part, for the following medical condition, which is the primary reason for home health care: Physical deconditioning, Parkinson's disease.    I certify that, based on my findings, the following services are medically necessary home health services: Occupational Therapy and Physical Therapy.    My clinical findings support the need for the above services because: Occupational Therapy Services are needed to assess and treat cognitive ability and address ADL safety due to impairment in deconditioning, balance, cognitive evaluation. and Physical Therapy Services are needed to assess and treat the following functional impairments: Deconditioning, imbalance.    Further, I certify that my clinical findings support that this patient is homebound (i.e. absences from home require considerable and taxing effort and are for medical reasons or [employer] services or infrequently or of short duration when for other reasons) because: Requires assistance of another person or specialized equipment to access medical services because patient:  Requires supervision of another for safe transfer...    Based on the above findings. I certify that this patient is confined to the home and needs intermittent skilled nursing care, physical therapy and/or speech therapy.  The patient is under my care, and I have initiated the establishment of the plan of care.  This patient will be followed by a physician who will periodically review the plan of care.  Physician/Provider to provide follow up care: Trung Galvez    Attending hospital physician (the Medicare [facility]): Tosin Dudley MD  Physician Signature: See electronic signature associated with these discharge orders.  Date: 11/29/2020     Full Code    As documented during admission     Diet    Follow this diet upon discharge: Orders Placed This Encounter      Regular Diet Adult       Significant Results and Procedures   Results for orders placed or performed during the hospital encounter of 11/27/20   XR Chest Port 1 View    Narrative    XR PORTABLE CHEST ONE VIEW   11/27/2020 6:43 PM     HISTORY: Cough, weak.    COMPARISON: Chest x-ray 12/19/2018.      Impression    IMPRESSION: Portable chest. Lungs are clear. Heart is normal in size.  No pneumothorax. Possible trace amount of left pleural fluid. No  obvious right pleural effusion. Left shoulder joint replacement  hardware again noted.    RUSSELL CHÁVEZ MD   CT Head w/o Contrast    Narrative    CT SCAN OF THE HEAD WITHOUT CONTRAST November 28, 2020 10:58 AM     HISTORY: Hallucination.    TECHNIQUE: Axial images of the head and coronal reformations without  IV contrast material. Radiation dose for this scan was reduced using  automated exposure control, adjustment of the mA and/or kV according  to patient size, or iterative reconstruction technique.    COMPARISON: Head CT 12/19/2019.    FINDINGS: Moderate volume loss is present. White matter  hypoattenuation likely represents moderate chronic small vessel  ischemic change. The cerebral  hemispheres, brainstem, and cerebellum  otherwise demonstrate normal morphology and attenuation. No evidence  of acute ischemia, hemorrhage, mass, mass effect or hydrocephalus.     The visualized calvarium, tympanic cavities, mastoid cavities, and  paranasal sinuses are unremarkable.      Impression    IMPRESSION: No acute intracranial abnormality.    ADALGISA ECHEVERRIA MD       Discharge Medications   Current Discharge Medication List      CONTINUE these medications which have CHANGED    Details   carbidopa-levodopa (SINEMET)  MG tablet Take 1 tablet by mouth 2 times daily Dose has been decreased from 3 times daily to 2 times daily, further outpatient evaluation by neurology to determine appropriate dose  Qty:      Associated Diagnoses: Parkinson's disease (H)         CONTINUE these medications which have NOT CHANGED    Details   !! acetaminophen (TYLENOL) 500 MG tablet Take 500 mg by mouth At Bedtime      !! acetaminophen (TYLENOL) 500 MG tablet Take 1,000 mg by mouth 2 times daily 1000, 1600      Apoaequorin (PREVAGEN PO) Take 1 tablet by mouth daily      melatonin 3 MG tablet Take 1 mg by mouth At Bedtime      !! warfarin (COUMADIN) 5 MG tablet Take 5 mg by mouth five times a week Monday, Tuesday, Wednesday, Friday, and Saturday      !! warfarin ANTICOAGULANT (COUMADIN) 5 MG tablet Take 2.5 mg by mouth twice a week Thursday and Sunday       !! - Potential duplicate medications found. Please discuss with provider.        Allergies   Allergies   Allergen Reactions     Oxycodone Anaphylaxis     Fentanyl Nausea     Propofol Nausea and Vomiting

## 2020-11-29 NOTE — PROGRESS NOTES
-diagnostic tests and consults completed and resulted:   NOT MET, pending neuro consults    -vital signs normal or at patient baseline:  NOT MET, VSS ex HTN, chronic     -tolerating oral intake to maintain hydration:  MET    -returns to baseline functional status:  NOT MET     -safe disposition plan has been identified:  NOT MET, planning with Care Coordinator currently.    Nurse to notify provider when observation goals have been met and patient is ready for discharge.

## 2020-11-29 NOTE — PLAN OF CARE
Discharge    Patient discharged to home AL--the [facility], in [place] via wheelchair to door 2. SonEstrada picking patient up.    Writer has discussed that  time will be 1730.    Listed belongings gathered and returned to patient. Yes  Care Plan and Patient education resolved: Yes  Prescriptions if needed, hard copies sent with patient  NA  Home and hospital acquired medications returned to patient: NA  Medication Bin checked and emptied on discharge Yes  Follow up appointment made for patient: No. Appointment phone listed in discharge instructions, by SW.  Discussed plan with Estrada cam and Amie, home care provider. Patient does not need re-filled medications, per Amie, who checked pill count with writer on phone.     Cognitive Concerns/ Orientation : A&O x 3, disoriented to situation   BEHAVIOR & AGGRESSION TOOL COLOR: Green     ABNL VS/O2: VSS on room air, ex HTN  MOBILITY: Up with 1 GBW  PAIN MANAGMENT: Denied  DIET: Regular  BOWEL/BLADDER: Voids in urinal at bedside   ABNL LAB/BG: unremarkable labs  DRAIN/DEVICES: PIV removed for d/c  TELEMETRY RHYTHM: n/a  SKIN: Scattered bruises  TESTS/PROCEDURES: N/a  D/C DAY/GOALS/PLACE: 1-2 days pending     --PT--discharge dispo--home with home health PT.  --Neurology--decrease Parkinson's meds (BID now). Follow up with neurologist 2 weeks after discharge.  AVS reviewed with patient. Verbalizes understanding, as does Estrada cam, whom writer discussed after visit care with.

## 2020-11-29 NOTE — PROGRESS NOTES
Care Coordination:    Notified son Estrada Feliz regarding observation status/MOON.  Will leave patient's copy in room.  Per Estrada, he would be involved in discharge planning.  He indicated his mother has a caregiver who also helps his dad.  They live at the [facility].    Sienna Cuenca RN, BSN, PHN  Inpatient Care Coordination  [facility]  Phone: [phone]

## 2020-11-29 NOTE — PROGRESS NOTES
11/27/20 6514  Observation goals PRIOR TO DISCHARGE     Comments: -diagnostic tests and consults completed and resulted: not met   -vital signs normal or at patient baseline: met  -tolerating oral intake to maintain hydration: met   -returns to baseline functional status:partially met   -safe disposition plan has been identified: met   Nurse to notify provider when observation goals have been met and patient is ready for discharge.

## 2020-11-29 NOTE — PROGRESS NOTES
[facility]      OUTPATIENT PHYSICAL THERAPY EVALUATION  PLAN OF TREATMENT FOR OUTPATIENT REHABILITATION  (COMPLETE FOR INITIAL CLAIMS ONLY)  Patient's Last Name, First Name, M.I.  YOB: 1934  Russ Loera                        Provider's Name  [facility] Medical Record No.  [id number]                               Onset Date:  11/27/20   Start of Care Date:  11/29/20      Type:     _X_PT   ___OT   ___SLP Medical Diagnosis:  hallucinations                        PT Diagnosis:  impaired mobility   Visits from SOC:  1   _________________________________________________________________________________  Plan of Treatment/Functional Goals    Planned Interventions: balance training, gait training, home exercise program, strengthening, ROM (range of motion), transfer training     Goals: See Physical Therapy Goals on Care Plan in [employer] electronic health record.    Therapy Frequency: 3x/week  Predicted Duration of Therapy Intervention: 3 tx days  _________________________________________________________________________________    I CERTIFY THE NEED FOR THESE SERVICES FURNISHED UNDER        THIS PLAN OF TREATMENT AND WHILE UNDER MY CARE     (Physician co-signature of this document indicates review and certification of the therapy plan).                Certification date from: 11/29/20, Certification date to: 12/05/20    Referring Physician: Tosin Dudley MD            Initial Assessment        See Physical Therapy evaluation dated 11/29/20 in Epic electronic health record.

## 2020-11-29 NOTE — PLAN OF CARE
DATE & TIME: 11/28/20, 2820 - 9434   Cognitive Concerns/ Orientation : A&O x 3, disoriented to situation   BEHAVIOR & AGGRESSION TOOL COLOR: Green   ABNL VS/O2: BP elevated. Other VSS on room air  MOBILITY: Up assist x 2 with GB and walker.  PAIN MANAGMENT: Denied  DIET: Regular  BOWEL/BLADDER: Voids in urinal at bedside with some incontinence  ABNL LAB/BG: N/a  DRAIN/DEVICES: PIV SL  TELEMETRY RHYTHM: n/a  SKIN: Scattered bruises  TESTS/PROCEDURES: N/a  D/C DAY/GOALS/PLACE: 1-2 days pending PT evaluation and once cleared by Neurology  OTHER IMPORTANT INFO: Patient voided one pink tinged urine but afterward urine yellow straw colored. Long arm sitter. Patient much less impulsive this shift  MD/RN ROUNDING SIGNED OFF D/E SHIFT: N/a   COMMIT TO SIT DONE AND SIGNED OFF Yes

## 2020-11-29 NOTE — CONSULTS
Care Management Initial Consult    General Information  Assessment completed with: dorina Dorado                 Communication Assessment  Patient's communication style: spoken language (English or Bilingual)    Hearing Difficulty or Deaf: yes   Wear Glasses or Blind: no    Cognitive  Cognitive/Neuro/Behavioral: .WDL except  Level of Consciousness: alert  Arousal Level: opens eyes spontaneously  Orientation: disoriented to;situation  Mood/Behavior: calm;cooperative  Best Language: 0 - No aphasia  Speech: clear    Living Environment:     Current living Arrangements:[facility] assisted living      Able to return to prior arrangements: Yes       Family/Social Support:  Care provided by: 24/7 caregiver    Provides care for: No one                  Description of Support System: Estrada cam is very involved           Current Resources:        Equipment currently used at home: walker, rolling;cane, straight;commode chair;grab bar, toilet;grab bar, tub/shower;lift device;shower chair(Lift chair; bed rails)      Employment/Financial:  Employment Status: Retired          Financial Concerns: None known             Lifestyle & Psychosocial Needs:        Socioeconomic History     Marital status:      Spouse name: Not on file     Number of children: Not on file     Years of education: Not on file     Highest education level: Not on file     Tobacco Use     Smoking status: Never Smoker     Smokeless tobacco: Never Used   Substance and Sexual Activity     Alcohol use: No     Drug use: No       Functional Status:  Prior to admission patient needed assistance: 24 hour caregiverElier provides care for patient and spouse in their [facility] apartment              Additional Information:  Spoke with DIEGO Padilla in the nursing office at the [facility]. They do not provide care for patient and state patient can return if his caregiver can receive him back today. They do use [facility] if PT is needed, but they do  not offer OT.  Spoke with caregiver, Amie who confirms she is in patient's apartment with his spouse and can provide care for patient also.  Called son, Estrada who will provide transport for patient. He would like a call once patient and his meds are ready as he states he lives only 2 minutes away.  Orders will be faxed to the nursing office.  Noted as PT and OT were both ordered, an agency that provides both therapies is needed. RN Care Coordinator will arrange the home care.    LIYAH Astorga

## 2020-11-29 NOTE — DISCHARGE INSTRUCTIONS
HOMECARE NOTE:   Your doctor has ordered home care to help you after your hospital stay.  The staff will contact you to schedule your first visit.  This service will be provided by [facility].  If you have any question, or have not received a call within 48 hours of discharge, please call them at [phone] or [phone].  *please see homecare quality ratings for all homecares in your area at www.medicare.gov       Please make a follow up appointment at the [facility]:    [address], [place] [address]  Phone: [phone]  Appointments: [facility]

## 2020-11-29 NOTE — PROGRESS NOTES
Observation goals       -diagnostic tests and consults completed and resulted:NOT MET   -vital signs normal or at patient baseline: NOT MET   -tolerating oral intake to maintain hydration: MET   -returns to baseline functional status: PARTIALLY MET   -safe disposition plan has been identified: NOT MET     Nurse to notify provider when observation goals have been met and patient is ready for discharge

## 2020-11-29 NOTE — CONSULTS
[facility]    Neurology Consultation     Date of Admission:  11/27/2020    Assessment & Plan   Russ Loera is a 86 year old male who was admitted on 11/27/2020. I was asked to see the patient for hallucinations in Parkinson patient.  Memory loss.  Reviewing the chart it looks like an infection or electrolytes abnormality were excluded.  The patient does not report hallucinations at present time.  At this time I would recommend:    - Decrease dosage of carbidopa levodopa to 25/100, twice a day  -The patient is to follow-up with his neurologist in a couple of weeks following discharge.  - Occupational Therapy to do further memory testing  - Physical therapy for gait training and knee arthritis  - Preferable not to use antipsychotic medication, if necessary low-dose of Seroquel can be tried.    Neurology will sign off, please call if questions.      Poornima Stuart MD    Code Status    Full Code    Reason for Consult   Reason for consult: I was asked by Dr Dudley to evaluate this patient for hallucinations in Parkinson patient.    Primary Care Physician   Trung Galvez    Chief Complaint   hallucinations in Parkinson patient.    History is obtained from the patient and chart    History of Present Illness   Russ Loera is a 86 year old male who presents with hallucinations in Parkinson patient.  The patient resides in an assisted living facility together with his wife who is in hospice.  Patient wife has cortical basilar degeneration.  The patient states that there is a nurse 24/ 7 with them.  He denies hallucinations today.  Apparently on admission the caregiver reported hallucinations as well as concern for fever and coughing.  The patient admits for having some memory loss.    The patient has been followed by Dr. Grijalva in [facility], [facility] for a long time for some parkinsonian features, the patient denies ever having a tremor.  The patient has been on  carbidopa levodopa 25/100, 3 times a day for a long period Of time this has not been changed.  The patient states that he has problems with his knees and has had knee pain.  He is able to ambulate.  He denies any other symptoms.    I talked by phone with patient's son Estrada, he is not sure if the patient had any improvement in his symptoms with carbidopa levodopa.  He states that her hallucinations have been present for longer than 2 days.    Past Medical History   I have reviewed this patient's medical history and updated it with pertinent information if needed.   Past Medical History:   Diagnosis Date     Antiphospholipid syndrome (H)      Degenerative joint disease      DVT (deep venous thrombosis) (H)      Factor V Leiden mutation (H)      Gastro-oesophageal reflux disease      GERD (gastroesophageal reflux disease)      History of BPH      Hypertension      Nonrheumatic mitral valve regurgitation      Osteoarthritis      Parkinsons disease (H)      Plantar fasciitis of right foot      PONV (postoperative nausea and vomiting)      Prostate nodule with urinary obstruction 7-9-12     Pulmonary embolism (H)     Jan 2010     Spinal stenosis      Tendonitis, Achilles, right      Unspecified cerebral artery occlusion with cerebral infarction        Past Surgical History   I have reviewed this patient's surgical history and updated it with pertinent information if needed.  Past Surgical History:   Procedure Laterality Date     C REMOVAL OF KIDNEY STONE       C TOTAL KNEE ARTHROPLASTY  2011     HERNIORRHAPHY INGUINAL       INCISION LIMBAL RELAXING, KERATOTOMY ARCUATE  1/20/2014    Procedure: INCISION LIMBAL RELAXING, KERATOTOMY ARCUATE;;  Surgeon: Rosalio Montoya MD;  Location: [facility]     INCISION LIMBAL RELAXING, KERATOTOMY ARCUATE  2/10/2014    Procedure: INCISION LIMBAL RELAXING, KERATOTOMY ARCUATE;;  Surgeon: Rosalio Montoya MD;  Location: [facility]     LAMINECTOMY LUMBAR MINIMALLY INVASIVE ONE LEVEL       LAMINECTOMY,  FUSION LUMBAR TWO LEVELS, COMBINED      L3-L5     PHACOEMULSIFICATION CLEAR CORNEA WITH STANDARD INTRAOCULAR LENS IMPLANT  1/20/2014    Procedure: PHACOEMULSIFICATION CLEAR CORNEA WITH STANDARD INTRAOCULAR LENS IMPLANT;  RIGHT PHACOEMULSIFICATION CLEAR CORNEA WITH STANDARD INTRAOCULAR LENS IMPLANT, LIMBRAL RELAXING INCISION  ;  Surgeon: Rosalio Montoya MD;  Location: [facility]     PHACOEMULSIFICATION CLEAR CORNEA WITH STANDARD INTRAOCULAR LENS IMPLANT  2/10/2014    Procedure: PHACOEMULSIFICATION CLEAR CORNEA WITH STANDARD INTRAOCULAR LENS IMPLANT;  LEFT PHACOEMULSIFICATION CLEAR CORNEA WITH STANDARD INTRAOCULAR LENS IMPLANT WITH LIMBAL RELAXING INCISION;  Surgeon: Rosalio Montoya MD;  Location: [facility]     REVERSE ARTHROPLASTY SHOULDER  12/4/2012    Procedure: REVERSE ARTHROPLASTY SHOULDER;  LEFT REVERSE TOTAL SHOULDER ARTHROPLASTY;  Surgeon: Tre Patel MD;  Location:  OR     TONSILLECTOMY         Prior to Admission Medications   Prior to Admission Medications   Prescriptions Last Dose Informant Patient Reported? Taking?   Apoaequorin (PREVAGEN PO) 11/27/2020 at Unknown time Care Giver Yes Yes   Sig: Take 1 tablet by mouth daily   acetaminophen (TYLENOL) 500 MG tablet 11/27/2020 at 1600 Care Giver Yes Yes   Sig: Take 1,000 mg by mouth 2 times daily 1000, 1600   acetaminophen (TYLENOL) 500 MG tablet 11/26/2020 at hs Care Giver Yes Yes   Sig: Take 500 mg by mouth At Bedtime   carbidopa-levodopa (SINEMET)  MG per tablet 11/27/2020 at x3 doses Care Giver Yes Yes   Sig: Take 1 tablet by mouth 3 times daily 0500; 1100; 1700   melatonin 3 MG tablet 11/26/2020 at hs Care Giver Yes Yes   Sig: Take 1 mg by mouth At Bedtime   warfarin (COUMADIN) 5 MG tablet 11/27/2020 at 5 mg Care Giver Yes Yes   Sig: Take 5 mg by mouth five times a week Monday, Tuesday, Wednesday, Friday, and Saturday   warfarin ANTICOAGULANT (COUMADIN) 5 MG tablet 11/26/2020 at 2.5 mg Care Giver Yes Yes   Sig: Take 2.5 mg by mouth twice a week  Thursday and Sunday      Facility-Administered Medications: None     Allergies   Allergies   Allergen Reactions     Oxycodone Anaphylaxis     Fentanyl Nausea     Propofol Nausea and Vomiting       Social History   I have reviewed this patient's social history and updated it with pertinent information if needed. Russ Loera  reports that he has never smoked. He has never used smokeless tobacco. He reports that he does not drink alcohol or use drugs.    Family History   I have reviewed this patient's family history and updated it with pertinent information if needed.   Family History   Problem Relation Age of Onset     No Known Problems Mother      No Known Problems Father        Review of Systems   The 10 point Review of Systems is negative other than noted in the HPI or here.     Physical Exam   Temp: 97.6  F (36.4  C) Temp src: Oral BP: 114/59 Pulse: 55   Resp: 18 SpO2: 93 % O2 Device: None (Room air)    Vital Signs with Ranges  Temp:  [97.6  F (36.4  C)-98.9  F (37.2  C)] 97.6  F (36.4  C)  Pulse:  [55-72] 55  Resp:  [14-20] 18  BP: ()/(59-85) 114/59  SpO2:  [92 %-99 %] 93 %  200 lbs 0 oz    Constitutional: normal  Eyes: no conjunctival erythema  ENT: neck is supple  Respiratory: no SOB or wheezing  Skin: small right tongue bruise  Musculoskeletal: no pedal edema  Neurologic: The patient is alert oriented x3 in no acute distress.  He follows commands appropriately.  He knows the name of the president.  He is very hard of hearing.  Pupils are conjugate, extraocular movements are intact.  There is no nystagmus.  Face is symmetric, facial muscles are equal bilaterally.  Tongue protrudes midline.  There is no pronator drift.  The patient can move both lower extremities equally.  There is some atrophy in the small muscles of the feet on both sides.  Finger-nose-finger without dysmetria.  Fine movements are normal.  I do not see any tremors today.  Neuropsychiatric: appropriate    Data   Results for orders  placed or performed during the hospital encounter of 11/27/20 (from the past 24 hour(s))   Vitamin B12   Result Value Ref Range    Vitamin B12 441 193 - 986 pg/mL   Folate   Result Value Ref Range    Folate 14.3 >5.4 ng/mL   Ammonia   Result Value Ref Range    Ammonia 27 10 - 50 umol/L   Factor 10 chromogenic   Result Value Ref Range    Chromogenic Factor 10 23 (L) 70 - 130 %     The CT scan images were reviewed by me.  There are no acute changes, there is mild atrophy and small vessel ischemic disease.    This is a telemedicine visit that was performed with the originating site at [facility]'s hospital bed at [facility] by [facility] and the distant side at [facility] neurology, in [place].  Verbal consent to participate in video visit was obtained.  This visit occurred during the coronavirus (COVID-19)  public health emergency.  I discussed with the patient the nature of our telemedicine visits, that:  - I would evaluate the patient and recommend diagnostics and treatments based on my assessment.   - Our sessions are not being recorded and that personal health information is protected.    - Our team would provide follow-up care in person if and when the patient need it.  Patient identification was verified before the start of the encounter.    Total time of visit: 50 minutes with the time spent on chart review, imaging and lab results review, and more than 50 % of the time spent on coordination of cares and face-to-face time with the patient including counseling regarding pathophysiology of the above conditions, results of the tests and further directions of care.

## 2020-11-29 NOTE — PROGRESS NOTES
11/29/20 1100   Quick Adds   Type of Visit Initial PT Evaluation   Living Environment   People in home spouse;other (see comments)  (24/7 caregiver for his wife)   Current Living Arrangements assisted living   Home Accessibility no concerns   Transportation Anticipated family or friend will provide   Living Environment Comments Walk in shower with a built in bench.    Self-Care   Usual Activity Tolerance moderate   Current Activity Tolerance fair   Regular Exercise No   Equipment Currently Used at Home walker, rolling;cane, straight;commode chair;grab bar, toilet;grab bar, tub/shower;lift device;shower chair  (Lift chair; bed rails)   Activity/Exercise/Self-Care Comment Patient's wife has a 24/7 caregiver but she also helps him with showering and dressing. Patient ambulates with a FWW at all times. He is limited by his B chronic knee OA and can only walk approximately 100 ft at a time.    Disability/Function   Hearing Difficulty or Deaf yes   Patient's preferred means of communication written communication;verbal   Describe hearing loss bilateral hearing loss   Use of hearing assistive devices amplifier   Wear Glasses or Blind no   Concentrating, Remembering or Making Decisions Difficulty no   Difficulty Communicating no   Difficulty Eating/Swallowing no   Walking or Climbing Stairs Difficulty yes   Walking or Climbing Stairs ambulation difficulty, requires equipment   Mobility Management FWW   Dressing/Bathing Difficulty yes   Dressing/Bathing dressing difficulty, assistance 1 person;bathing difficulty, requires equipment;bathing difficulty, assistance 1 person   Dressing/Bathing Management Caregiver assists, shower bench    Toileting no   Fall history within last six months no   General Information   Onset of Illness/Injury or Date of Surgery 11/27/20   Referring Physician Tosin Dudley MD   Patient/Family Therapy Goals Statement (PT) to return home    Pertinent History of Current Problem (include personal  factors and/or comorbidities that impact the POC) Patient is 87 YO M admitted due to hallucinations, Head CT negative for acute findings. PMH: Parkinson's disease, HTN, DVT/PE, Factor V Leiden, B knee OA   Existing Precautions/Restrictions fall   General Observations Patient sitting up in recliner chair, agreeable to PT   Cognition   Orientation Status (Cognition) oriented x 4   Affect/Mental Status (Cognition) WFL   Follows Commands (Cognition) follows two-step commands   Memory Deficit (Cognition) short-term memory   Cognitive Status Comments Patient with intermittent forgetfulness during session   Pain Assessment   Patient Currently in Pain No   Posture    Posture Forward head position;Protracted shoulders   Range of Motion (ROM)   ROM Comment B knee flexion contracture approximately 15-20 degrees   Strength   Manual Muscle Testing Quick Adds Able to perform R SLR;Able to perform L SLR;Deficits observed during functional mobility   Bed Mobility   Bed Mobility supine-sit;sit-supine   Supine-Sit [place] (Bed Mobility) supervision   Sit-Supine [place] (Bed Mobility) supervision   Comment (Bed Mobility) Patient demonstrated supine <> sit from head of bed flat with use of 1 bed rail and SBA, difficulty noted but no physical assist needed.    Transfers   Transfers sit-stand transfer;bed-chair transfer   Transfer Safety Concerns Noted losing balance backward   Impairments Contributing to Impaired Transfers decreased ROM;decreased strength   Bed-Chair Transfer   Bed-Chair [place] (Transfers) contact guard   Assistive Device (Bed-Chair Transfers) walker, front-wheeled   Sit-Stand Transfer   Sit-Stand [place] (Transfers) contact guard;minimum assist (75% patient effort)   Assistive Device (Sit-Stand Transfers) walker, front-wheeled   Sit/Stand Transfer Comments Sit <> stand from recliner chair with FWW gait belt, Min assist for balance during sit to stand, CGA for stand to sit   Gait/Stairs (Locomotion)    [place] (Gait) contact guard   Assistive Device (Gait) walker, front-wheeled   Distance in Feet (Required for LE Total Joints) 35'   Pattern (Gait) step-through   Deviations/Abnormal Patterns (Gait) wilberto decreased;festinating/shuffling;other (see comments)  (B flexed knees, forward flexed posture)   Comment (Gait/Stairs) Patient ambulated with CGA due to increasing knee flexion with increased distance, no loss of balance but unsteadiness throughout   Balance   Balance other (describe)   Balance Comments Good static sitting balance, able to maintain standing balance at FWW, but intermittent imbalance during transfers requiring min asssit   Clinical Impression   Criteria for Skilled Therapeutic Intervention yes, treatment indicated   PT Diagnosis (PT) impaired mobility   Influenced by the following impairments generalized weakness, impaired ROM in B knee, decresaed activity tolerance   Functional limitations due to impairments increased difficulty with tranfsers and gait   Clinical Presentation Stable/Uncomplicated   Clinical Presentation Rationale medical status   Clinical Decision Making (Complexity) low complexity   Therapy Frequency (PT) 3x/week   Predicted Duration of Therapy Intervention (days/wks) 3 tx days   Planned Therapy Interventions (PT) balance training;gait training;home exercise program;strengthening;ROM (range of motion);transfer training   Anticipated Equipment Needs at Discharge (PT)   (all DME needs met)   Risk & Benefits of therapy have been explained evaluation/treatment results reviewed;care plan/treatment goals reviewed;risks/benefits reviewed;current/potential barriers reviewed;participants voiced agreement with care plan;participants included;patient   Clinical Impression Comments Patient with chronic activity tolerance limitations due to his Parkinson's and B knee OA   PT Discharge Planning    PT Discharge Recommendation (DC Rec) home with assist;home with home care physical  therapy   PT Rationale for DC Rec Patient slightly below baseline level of independence but he has a 24/7 caregiver in his apartment that would be able to provide assist to him upon returning home. He has a lift chair he uses to aid with transfers and wheelchair if he is too fatigued to ambulate; Due to activity tolerance limtiations and generalized weakness, HHPT recommended to maximize safety and independence with household mobility   PT Brief overview of current status  SBA for bed mobility, CGA-Min assist for sit <> stand transfers, CGA for short distance gait with FWW   Therapy Certification   Start of care date 11/29/20   Certification date from 11/29/20   Certification date to 12/05/20   Medical Diagnosis hallucinations   Total Evaluation Time   Total Evaluation Time (Minutes) 18

## 2020-11-29 NOTE — PLAN OF CARE
OT: Orders received. Chart reviewed and discussed with care team.  OT not indicated as per PT and chart, pt has 24 hr care and A with ADL/IADL's, pt at baseline with ADL's, denies hallucinations now.  Will complete orders.

## 2020-11-29 NOTE — PROGRESS NOTES
11/27/20 2248   Observation goals PRIOR TO DISCHARGE     Comments: -diagnostic tests and consults completed and resulted: not met   -vital signs normal or at patient baseline: met  -tolerating oral intake to maintain hydration: met   -returns to baseline functional status:partially met   -safe disposition plan has been identified: met   Nurse to notify provider when observation goals have been met and patient is ready for discharge.

## 2020-11-29 NOTE — PROGRESS NOTES
Care Management Discharge Note    Discharge Date: 11/29/20       Discharge Disposition: Home Care    Discharge Services:  PT/OT    Discharge DME:  n/a    Discharge Transportation: family or friend will provide    Private pay costs discussed: Not applicable      Patient/family educated on Medicare website which has current facility and service quality ratings: yes      Persons Notified of Discharge Plans: son  Patient/Family in Agreement with the Plan: yes    Handoff Referral Completed: No    Additional Information:  Patient needs  PT and OT.  Spoke to patient's son Estrada and he is in agreement with [facility] home care.  Referral emailed.        Sienna Cuenca RN

## 2020-11-29 NOTE — PLAN OF CARE
DATE & TIME: 11/28/2020 [id number]    Cognitive Concerns/ Orientation : Mentation waxes and wanes. Patient is pleasant and cooperative. Long arm is still assigned.  BEHAVIOR & AGGRESSION TOOL COLOR: Green   ABNL VS/O2: VSS/ 99% on RA  MOBILITY: Ax2 GB+walker   PAIN MANAGMENT: Denies   DIET: Regular, good appetite  BOWEL/BLADDER: Continent. Uses urinal frequently.  ABNL LAB/BG: Covid negative, precautions discontinued.   DRAIN/DEVICES: PIV saline locked   SKIN: Intact ex scattered bruising.   TESTS/PROCEDURES: CT of head today, negative for any acute changes.   D/C DAY/GOALS/PLACE: Pending   OTHER IMPORTANT INFO:

## 2020-12-01 ENCOUNTER — TELEPHONE (OUTPATIENT)
Dept: CARE COORDINATION | Facility: CLINIC | Age: 85
End: 2020-12-01

## 2020-12-01 NOTE — PLAN OF CARE
Physical Therapy Discharge Summary    Reason for therapy discharge:    Discharged to home with home therapy.    Progress towards therapy goal(s). See goals on Care Plan in [facility] electronic health record for goal details.  Goals not met.  Barriers to achieving goals:   limited tolerance for therapy and discharge from facility.    Therapy recommendation(s):    Continued therapy is recommended.  Rationale/Recommendations:  Patient slightly below baseline level of independence but he has a 24/7 caregiver in his apartment that would be able to provide assist to him upon returning home. He has a lift chair he uses to aid with transfers and wheelchair if he is too fatigued to ambulate; Due to activity tolerance limtiations and generalized weakness, HHPT recommended to maximize safety and independence with household mobility.

## 2020-12-01 NOTE — TELEPHONE ENCOUNTER
Dear ,  [facility] home care received referral for Physical Therapy.    Medicare Home Health regulations requires [facility] to provide an initial assessment visit either within 48 hours of the patient's return home, or on the physician ordered Start of Care date.    There will be a delay in the Initial Assessment for Russ Loera; MRN [id number]  We anticipate the Start of care will be 12/2/20 date.  This is notification only, no reply needed      Sincerely [facility]   Chichi Myanard, DIEGO  [phone]